# Patient Record
Sex: FEMALE | Race: WHITE | Employment: FULL TIME | URBAN - METROPOLITAN AREA
[De-identification: names, ages, dates, MRNs, and addresses within clinical notes are randomized per-mention and may not be internally consistent; named-entity substitution may affect disease eponyms.]

---

## 2019-07-10 ENCOUNTER — OFFICE VISIT (OUTPATIENT)
Dept: FAMILY MEDICINE CLINIC | Age: 29
End: 2019-07-10

## 2019-07-10 VITALS
RESPIRATION RATE: 15 BRPM | OXYGEN SATURATION: 100 % | TEMPERATURE: 98.3 F | WEIGHT: 193 LBS | HEIGHT: 67 IN | DIASTOLIC BLOOD PRESSURE: 82 MMHG | BODY MASS INDEX: 30.29 KG/M2 | SYSTOLIC BLOOD PRESSURE: 127 MMHG | HEART RATE: 71 BPM

## 2019-07-10 DIAGNOSIS — J02.9 SORE THROAT: ICD-10-CM

## 2019-07-10 DIAGNOSIS — J06.9 VIRAL UPPER RESPIRATORY TRACT INFECTION: Primary | ICD-10-CM

## 2019-07-10 LAB
QUICKVUE INFLUENZA TEST: NEGATIVE
S PYO AG THROAT QL: NEGATIVE
VALID INTERNAL CONTROL?: YES
VALID INTERNAL CONTROL?: YES

## 2019-07-10 RX ORDER — SPIRONOLACTONE 50 MG/1
75 TABLET, FILM COATED ORAL DAILY
COMMUNITY
End: 2022-02-15 | Stop reason: SDUPTHER

## 2019-07-10 NOTE — PROGRESS NOTES
Patient has had a headache and sore throat for 2 days, and has been exposed to the flu from a family member.

## 2019-07-10 NOTE — PATIENT INSTRUCTIONS
Sore Throat: Care Instructions  Your Care Instructions    Infection by bacteria or a virus causes most sore throats. Cigarette smoke, dry air, air pollution, allergies, and yelling can also cause a sore throat. Sore throats can be painful and annoying. Fortunately, most sore throats go away on their own. If you have a bacterial infection, your doctor may prescribe antibiotics. Follow-up care is a key part of your treatment and safety. Be sure to make and go to all appointments, and call your doctor if you are having problems. It's also a good idea to know your test results and keep a list of the medicines you take. How can you care for yourself at home? · If your doctor prescribed antibiotics, take them as directed. Do not stop taking them just because you feel better. You need to take the full course of antibiotics. · Gargle with warm salt water once an hour to help reduce swelling and relieve discomfort. Use 1 teaspoon of salt mixed in 1 cup of warm water. · Take an over-the-counter pain medicine, such as acetaminophen (Tylenol), ibuprofen (Advil, Motrin), or naproxen (Aleve). Read and follow all instructions on the label. · Be careful when taking over-the-counter cold or flu medicines and Tylenol at the same time. Many of these medicines have acetaminophen, which is Tylenol. Read the labels to make sure that you are not taking more than the recommended dose. Too much acetaminophen (Tylenol) can be harmful. · Drink plenty of fluids. Fluids may help soothe an irritated throat. Hot fluids, such as tea or soup, may help decrease throat pain. · Use over-the-counter throat lozenges to soothe pain. Regular cough drops or hard candy may also help. These should not be given to young children because of the risk of choking. · Do not smoke or allow others to smoke around you. If you need help quitting, talk to your doctor about stop-smoking programs and medicines.  These can increase your chances of quitting for good. · Use a vaporizer or humidifier to add moisture to your bedroom. Follow the directions for cleaning the machine. When should you call for help? Call your doctor now or seek immediate medical care if:    · You have new or worse trouble swallowing.     · Your sore throat gets much worse on one side.    Watch closely for changes in your health, and be sure to contact your doctor if you do not get better as expected. Where can you learn more? Go to http://sheila-odalys.info/. Enter [id number] [id number] in the search box to learn more about \"Sore Throat: Care Instructions. \"  Current as of: March 27, 2018  Content Version: 11.9  © 2843-1569 [employer]. Care instructions adapted under license by [employer] (which disclaims liability or warranty for this information). If you have questions about a medical condition or this instruction, always ask your healthcare professional. [address] any warranty or liability for your use of this information.

## 2019-07-10 NOTE — PROGRESS NOTES
Subjective:   Lamberto Daley is a 33 y.o. female who present complaining of flu-like symptoms:  myalgias, congestion, sore throat for 2 days. She denies dyspnea or wheezing. Smoking status: non-smoker. Flu vaccine status: vaccinated currently. Relevant PMH: No pertinent additional PMH. Review of Systems  A comprehensive review of systems was negative except for that written in the HPI. There is no problem list on file for this patient. There are no active problems to display for this patient. Current Outpatient Medications   Medication Sig Dispense Refill    spironolactone (ALDACTONE) 50 mg tablet Take  by mouth daily.  MULTIVITAMIN PO Take  by mouth.  levonorgestrel (MIRENA) 20 mcg/24 hours (5 yrs) 52 mg IUD 1 Device by IntraUTERine route once. Allergies   Allergen Reactions    Augmentin [Amoxicillin-Pot Clavulanate] Hives     Past Medical History:   Diagnosis Date    Hypertension      Past Surgical History:   Procedure Laterality Date    HX OTHER SURGICAL  2001    multiple calcium deposit cyst removals     Family History   Problem Relation Age of Onset    Cancer Mother     Diabetes Father     Hypertension Brother      Social History     Tobacco Use    Smoking status: Former Smoker     Packs/day: 0.50     Years: 4.00     Pack years: 2.00     Types: Cigarettes     Start date: 7/10/2011     Last attempt to quit: 7/10/2015     Years since quittin.0    Smokeless tobacco: Never Used   Substance Use Topics    Alcohol use: Yes     Frequency: Monthly or less       Objective:     Visit Vitals  /82   Pulse 71   Temp 98.3 °F (36.8 °C) (Oral)   Resp 15   Ht 5' 7\" (1.702 m)   Wt 193 lb (87.5 kg)   SpO2 100%   BMI 30.23 kg/m²       Appears moderately ill but not toxic; temperature as noted in vitals. Ears normal.   Throat and pharynx normal.    Neck supple. No adenopathy in the neck. Sinuses non tender. The chest is clear.   Strep and flu swab negative  Assessment/Plan: Influenza unlikely and swab negative  Likely viral uri  no flu  Symptomatic therapy suggested: rest, increase fluids and call prn if symptoms persist or worsen. Call or return to clinic prn if these symptoms worsen or fail to improve as anticipated. ICD-10-CM ICD-9-CM    1. Viral upper respiratory tract infection J06.9 465.9 AMB POC RAPID INFLUENZA TEST      AMB POC RAPID STREP A   2. Sore throat J02.9 462 AMB POC RAPID INFLUENZA TEST      AMB POC RAPID STREP A   .

## 2022-01-27 ENCOUNTER — OFFICE VISIT (OUTPATIENT)
Dept: PRIMARY CARE CLINIC | Age: 32
End: 2022-01-27
Payer: COMMERCIAL

## 2022-01-27 VITALS
HEIGHT: 67 IN | DIASTOLIC BLOOD PRESSURE: 87 MMHG | HEART RATE: 100 BPM | RESPIRATION RATE: 17 BRPM | TEMPERATURE: 97.8 F | OXYGEN SATURATION: 100 % | BODY MASS INDEX: 32.8 KG/M2 | WEIGHT: 209 LBS | SYSTOLIC BLOOD PRESSURE: 134 MMHG

## 2022-01-27 DIAGNOSIS — J02.9 PHARYNGITIS, UNSPECIFIED ETIOLOGY: Primary | ICD-10-CM

## 2022-01-27 LAB
S PYO AG THROAT QL: NEGATIVE
VALID INTERNAL CONTROL?: YES

## 2022-01-27 PROCEDURE — 99203 OFFICE O/P NEW LOW 30 MIN: CPT | Performed by: FAMILY MEDICINE

## 2022-01-27 PROCEDURE — 87880 STREP A ASSAY W/OPTIC: CPT | Performed by: FAMILY MEDICINE

## 2022-01-27 RX ORDER — MELATONIN
DAILY
COMMUNITY

## 2022-01-27 NOTE — PROGRESS NOTES
HPI     Chief Complaint   Patient presents with    New Patient   [address]     She is a 27 y.o. female who presents for establishing care. States she has a sore throat that has been bothering her for about a month or two on and off. States it irritates more when she eats things with seeds or nuts. Hurts on the R side. No pus or purulent drainage. Also has a dry cough. Exposed to View and Chew 1/17/22. Tested negative at [facility] on Friday with PCR. Sometimes has seasonal allergies, has not been taking anything. Does have some post nasal drainage. Works at [employer]. Review of Systems   Constitutional: Negative for chills and fever. HENT: Positive for sore throat. Negative for ear pain and sinus pain. Respiratory: Positive for cough. Negative for shortness of breath. Dry cough. Cardiovascular: Negative for chest pain. Gastrointestinal: Negative for diarrhea, nausea and vomiting. Reviewed PmHx, RxHx, FmHx, SocHx, AllgHx and updated and dated in the chart. Physical Exam:  Visit Vitals  /87 (BP 1 Location: Left upper arm, BP Patient Position: Sitting, BP Cuff Size: Adult long)   Pulse 100   Temp 97.8 °F (36.6 °C) (Temporal)   Resp 17   Ht 5' 7\" (1.702 m)   Wt 209 lb (94.8 kg)   SpO2 100%   BMI 32.73 kg/m²     Physical Exam  Vitals and nursing note reviewed. Constitutional:       General: She is not in acute distress. Appearance: Normal appearance. She is not ill-appearing. HENT:      Mouth/Throat:      Comments: Mildly erythematous posterior pharynx. No exudate. Cardiovascular:      Rate and Rhythm: Normal rate and regular rhythm. Heart sounds: No murmur heard. Pulmonary:      Effort: Pulmonary effort is normal. No respiratory distress. Breath sounds: Normal breath sounds. Lymphadenopathy:      Cervical: No cervical adenopathy. Neurological:      General: No focal deficit present. Mental Status: She is alert.    Psychiatric: Mood and Affect: Mood normal.         Behavior: Behavior normal.       POC Strep neg  No results found for this or any previous visit (from the past 12 hour(s)). Assessment / Plan     Diagnoses and all orders for this visit:    1. Pharyngitis, unspecified etiology  -     AMB POC RAPID STREP A       Likely 2/2 seasonal allergies. Recommend Flonase Sensimist and daily antihistamine to see if this helps. If persistent after 1-2 weeks of treatment call back for referral to ENT. I have discussed the diagnosis with the patient and the intended plan as seen in the above orders. The patient has received an after-visit summary and questions were answered concerning future plans. I have discussed medication side effects and warnings with the patient as well.     Jonna Cadena, DO

## 2022-01-27 NOTE — PATIENT INSTRUCTIONS
Sore Throat: Care Instructions  Your Care Instructions     Infection by bacteria or a virus causes most sore throats. Cigarette smoke, dry air, air pollution, allergies, and yelling can also cause a sore throat. Sore throats can be painful and annoying. Fortunately, most sore throats go away on their own. If you have a bacterial infection, your doctor may prescribe antibiotics. Follow-up care is a key part of your treatment and safety. Be sure to make and go to all appointments, and call your doctor if you are having problems. It's also a good idea to know your test results and keep a list of the medicines you take. How can you care for yourself at home? · If your doctor prescribed antibiotics, take them as directed. Do not stop taking them just because you feel better. You need to take the full course of antibiotics. · Gargle with warm salt water once an hour to help reduce swelling and relieve discomfort. Use 1 teaspoon of salt mixed in 1 cup of warm water. · Take an over-the-counter pain medicine, such as acetaminophen (Tylenol), ibuprofen (Advil, Motrin), or naproxen (Aleve). Read and follow all instructions on the label. · Be careful when taking over-the-counter cold or flu medicines and Tylenol at the same time. Many of these medicines have acetaminophen, which is Tylenol. Read the labels to make sure that you are not taking more than the recommended dose. Too much acetaminophen (Tylenol) can be harmful. · Drink plenty of fluids. Fluids may help soothe an irritated throat. Hot fluids, such as tea or soup, may help decrease throat pain. · Use over-the-counter throat lozenges to soothe pain. Regular cough drops or hard candy may also help. These should not be given to young children because of the risk of choking. · Do not smoke or allow others to smoke around you. If you need help quitting, talk to your doctor about stop-smoking programs and medicines.  These can increase your chances of quitting for good. · Use a vaporizer or humidifier to add moisture to your bedroom. Follow the directions for cleaning the machine. When should you call for help? Call your doctor now or seek immediate medical care if:    · You have new or worse trouble swallowing.     · Your sore throat gets much worse on one side. Watch closely for changes in your health, and be sure to contact your doctor if you do not get better as expected. Where can you learn more? Go to http://www.gray.com/  Enter U420 in the search box to learn more about \"Sore Throat: Care Instructions. \"  Current as of: December 2, 2020               Content Version: 13.0  © 7048-8169 [employer]. Care instructions adapted under license by [employer] (which disclaims liability or warranty for this information).  If you have questions about a medical condition or this instruction, always ask your healthcare professional. [address] any warranty or liability for your use of this information.

## 2022-01-27 NOTE — PROGRESS NOTES
Identified pt with two pt identifiers(name and ). Chief Complaint   Patient presents with    New Patient    Establish Care        3 most recent PHQ Screens 2022   Little interest or pleasure in doing things Not at all   Feeling down, depressed, irritable, or hopeless Not at all   Total Score PHQ 2 0        Vitals:    22 0854 22 0858   BP: 134/87    Pulse: (!) 113 100   Resp: 17    Temp: 97.8 °F (36.6 °C)    TempSrc: Temporal    SpO2: 100%    Weight: 209 lb (94.8 kg)    Height: 5' 7\" (1.702 m)    PainSc:   0 - No pain        Health Maintenance Due   Topic    Hepatitis C Screening     Cervical cancer screen     Flu Vaccine (1)    COVID-19 Vaccine (3 - Booster for Moderna series)       1. Have you been to the ER, urgent care clinic since your last visit? Hospitalized since your last visit? [facility] in [place] on Friday for neg COVID-19 test    2. Have you seen or consulted any other health care providers outside of the [address] [place] [address] since your last visit? Include any pap smears or colon screening.  No

## 2022-02-14 ENCOUNTER — HOSPITAL ENCOUNTER (EMERGENCY)
Age: 32
Discharge: HOME | End: 2022-02-14
Attending: EMERGENCY MEDICINE
Payer: COMMERCIAL

## 2022-02-14 ENCOUNTER — APPOINTMENT (OUTPATIENT)
Dept: GENERAL RADIOLOGY | Age: 32
End: 2022-02-14
Attending: EMERGENCY MEDICINE
Payer: COMMERCIAL

## 2022-02-14 VITALS
HEART RATE: 73 BPM | DIASTOLIC BLOOD PRESSURE: 84 MMHG | WEIGHT: 216.05 LBS | TEMPERATURE: 98.3 F | OXYGEN SATURATION: 98 % | SYSTOLIC BLOOD PRESSURE: 138 MMHG | BODY MASS INDEX: 33.84 KG/M2 | RESPIRATION RATE: 14 BRPM

## 2022-02-14 DIAGNOSIS — R07.9 CHEST PAIN, UNSPECIFIED TYPE: Primary | ICD-10-CM

## 2022-02-14 LAB
ALBUMIN SERPL-MCNC: 4.6 G/DL (ref 3.5–5)
ALBUMIN/GLOB SERPL: 1.3 {RATIO} (ref 1.1–2.2)
ALP SERPL-CCNC: 78 U/L (ref 45–117)
ALT SERPL-CCNC: 50 U/L (ref 12–78)
ANION GAP SERPL CALC-SCNC: 11 MMOL/L (ref 5–15)
AST SERPL-CCNC: 31 U/L (ref 15–37)
ATRIAL RATE: 91 BPM
BASOPHILS # BLD: 0.1 K/UL (ref 0–0.1)
BASOPHILS NFR BLD: 1 % (ref 0–1)
BILIRUB SERPL-MCNC: 0.3 MG/DL (ref 0.2–1)
BUN SERPL-MCNC: 15 MG/DL (ref 6–20)
BUN/CREAT SERPL: 20 (ref 12–20)
CALCIUM SERPL-MCNC: 9.3 MG/DL (ref 8.5–10.1)
CALCULATED P AXIS, ECG09: 36 DEGREES
CALCULATED R AXIS, ECG10: 1 DEGREES
CALCULATED T AXIS, ECG11: 18 DEGREES
CHLORIDE SERPL-SCNC: 100 MMOL/L (ref 97–108)
CO2 SERPL-SCNC: 26 MMOL/L (ref 21–32)
CREAT SERPL-MCNC: 0.74 MG/DL (ref 0.55–1.02)
D DIMER PPP FEU-MCNC: 0.22 MG/L FEU (ref 0–0.65)
DIAGNOSIS, 93000: NORMAL
DIFFERENTIAL METHOD BLD: ABNORMAL
EOSINOPHIL # BLD: 0.2 K/UL (ref 0–0.4)
EOSINOPHIL NFR BLD: 2 % (ref 0–7)
ERYTHROCYTE [DISTWIDTH] IN BLOOD BY AUTOMATED COUNT: 10.9 % (ref 11.5–14.5)
GLOBULIN SER CALC-MCNC: 3.6 G/DL (ref 2–4)
GLUCOSE SERPL-MCNC: 103 MG/DL (ref 65–100)
HCG UR QL: NEGATIVE
HCT VFR BLD AUTO: 41 % (ref 35–47)
HGB BLD-MCNC: 13.9 G/DL (ref 11.5–16)
IMM GRANULOCYTES # BLD AUTO: 0 K/UL (ref 0–0.04)
IMM GRANULOCYTES NFR BLD AUTO: 0 % (ref 0–0.5)
LIPASE SERPL-CCNC: 123 U/L (ref 73–393)
LYMPHOCYTES # BLD: 3.8 K/UL (ref 0.8–3.5)
LYMPHOCYTES NFR BLD: 43 % (ref 12–49)
MCH RBC QN AUTO: 31.9 PG (ref 26–34)
MCHC RBC AUTO-ENTMCNC: 33.9 G/DL (ref 30–36.5)
MCV RBC AUTO: 94 FL (ref 80–99)
MONOCYTES # BLD: 0.4 K/UL (ref 0–1)
MONOCYTES NFR BLD: 5 % (ref 5–13)
NEUTS SEG # BLD: 4.5 K/UL (ref 1.8–8)
NEUTS SEG NFR BLD: 49 % (ref 32–75)
NRBC # BLD: 0 K/UL (ref 0–0.01)
NRBC BLD-RTO: 0 PER 100 WBC
P-R INTERVAL, ECG05: 130 MS
PLATELET # BLD AUTO: 458 K/UL (ref 150–400)
PMV BLD AUTO: 8.7 FL (ref 8.9–12.9)
POTASSIUM SERPL-SCNC: 3.4 MMOL/L (ref 3.5–5.1)
PROT SERPL-MCNC: 8.2 G/DL (ref 6.4–8.2)
Q-T INTERVAL, ECG07: 350 MS
QRS DURATION, ECG06: 84 MS
QTC CALCULATION (BEZET), ECG08: 430 MS
RBC # BLD AUTO: 4.36 M/UL (ref 3.8–5.2)
SODIUM SERPL-SCNC: 137 MMOL/L (ref 136–145)
TROPONIN-HIGH SENSITIVITY: 7 NG/L (ref 0–51)
VENTRICULAR RATE, ECG03: 91 BPM
WBC # BLD AUTO: 9 K/UL (ref 3.6–11)

## 2022-02-14 PROCEDURE — 84484 ASSAY OF TROPONIN QUANT: CPT

## 2022-02-14 PROCEDURE — 85379 FIBRIN DEGRADATION QUANT: CPT

## 2022-02-14 PROCEDURE — 85025 COMPLETE CBC W/AUTO DIFF WBC: CPT

## 2022-02-14 PROCEDURE — 81025 URINE PREGNANCY TEST: CPT

## 2022-02-14 PROCEDURE — 71046 X-RAY EXAM CHEST 2 VIEWS: CPT

## 2022-02-14 PROCEDURE — 80053 COMPREHEN METABOLIC PANEL: CPT

## 2022-02-14 PROCEDURE — 36415 COLL VENOUS BLD VENIPUNCTURE: CPT

## 2022-02-14 PROCEDURE — 93005 ELECTROCARDIOGRAM TRACING: CPT

## 2022-02-14 PROCEDURE — 99285 EMERGENCY DEPT VISIT HI MDM: CPT

## 2022-02-14 PROCEDURE — 83690 ASSAY OF LIPASE: CPT

## 2022-02-14 NOTE — DISCHARGE INSTRUCTIONS
Return the emergency department if your symptoms are worsening, if you are unable to eat or drink, very short of breath, or develop any other symptoms you find concerning. In the meantime please follow-up with your primary care physician. I recommend you take some over-the-counter omeprazole given your symptoms started after you are having heartburn which may indicate some esophagitis.

## 2022-02-14 NOTE — ED PROVIDER NOTES
HPI   16-year-old female with past medical history of hypertension who presents to the emergency department due to chest tightness. She has had the symptoms for the last 2 days. Prior to this she was having issues with acid reflux and was taking some antacids which improved the burning sensation she had, however after that she developed fairly persistent chest tightness without any exacerbating or alleviating factors. She denies shortness of breath, palpitations, or syncope. She denies any abdominal pain, nausea, or vomiting. She has never had symptoms like this in the past and states that the chest tightness was \"freaking her out\". She denies fevers or chills. She denies cough. Past Medical History:   Diagnosis Date    HPV in female     Hypertension        Past Surgical History:   Procedure Laterality Date    HX OTHER SURGICAL  2001    multiple calcium deposit cyst removals         Family History:   Problem Relation Age of Onset    Cancer Mother     Diabetes Father     Hypertension Brother        Social History     Socioeconomic History    Marital status: SINGLE     Spouse name: Not on file    Number of children: Not on file    Years of education: Not on file    Highest education level: Not on file   Occupational History    Not on file   Tobacco Use    Smoking status: Former Smoker     Packs/day: 0.50     Years: 4.00     Pack years: 2.00     Types: Cigarettes     Start date: 7/10/2011     Quit date: 7/10/2015     Years since quittin.6    Smokeless tobacco: Never Used   Vaping Use    Vaping Use: Never used   Substance and Sexual Activity    Alcohol use:  Yes    Drug use: Never    Sexual activity: Not Currently   Other Topics Concern    Not on file   Social History Narrative    Not on file     Social Determinants of Health     Financial Resource Strain:     Difficulty of Paying Living Expenses: Not on file   Food Insecurity:     Worried About Running Out of Food in the Last Year: Not on file    [address] in the Last Year: Not on file   Transportation Needs:     Lack of Transportation (Medical): Not on file    Lack of Transportation (Non-Medical): Not on file   Physical Activity: Unknown    Days of Exercise per Week: 0 days    Minutes of Exercise per Session: Not on file   Stress:     Feeling of Stress : Not on file   Social Connections:     Frequency of Communication with Friends and Family: Not on file    Frequency of Social Gatherings with Friends and Family: Not on file    Attends [employer] Services: Not on file    Active Member of [address] [place] [address] or Organizations: Not on file    Attends Club or Organization Meetings: Not on file    Marital Status: Not on file   Intimate Partner Violence: Not At Risk    Fear of Current or Ex-Partner: No    Emotionally Abused: No    Physically Abused: No    Sexually Abused: No   Housing Stability:     Unable to Pay for Housing in the Last Year: Not on file    Number of [place] in the Last Year: Not on file    Unstable Housing in the Last Year: Not on file         ALLERGIES: Augmentin [amoxicillin-pot clavulanate]    Review of Systems   A complete review of systems was performed and all systems reviewed are negative unless otherwise documented in the HPI. Vitals:    02/14/22 1324 02/14/22 1330   BP: (!) 167/115 (!) 158/91   Pulse: 90 93   Resp: 16    Temp: 98.3 °F (36.8 °C)    SpO2: 100% 98%   Weight: 98 kg (216 lb 0.8 oz)             Physical Exam  Constitutional:       Comments: Appears well. No acute distress noted   Eyes:      Extraocular Movements: Extraocular movements intact. Comments: No scleral icterus   Neck:      Vascular: No JVD. Comments: Trachea midline  Cardiovascular:      Comments: Regular rate and rhythm without murmurs. 2+ radial pulses bilaterally no murmurs  Pulmonary:      Comments: Lungs are clear bilaterally. No wheezes or rales.   No respiratory distress  Abdominal:      Comments: Soft, nontender, nondistended. Negative Méndez sign. No CVA tenderness bilaterally   Musculoskeletal:         General: Normal range of motion. Right lower leg: No edema. Left lower leg: No edema. Comments: No calf tenderness bilaterally   Skin:     General: Skin is warm and dry. Neurological:      Comments: Awake and alert. Speech is normal.  GCS 15. MDM   59-year-old female presents with the above chief complaint. Vital signs are stable. EKG shows normal sinus rhythm with a rate of 91 QTC of 430. Her exam is unremarkable. Work-up will include cardiac enzymes, basic labs, and a D-dimer. Chest x-ray will be ordered as well. Of a fairly low suspicion for ACS. The patient is a non-smoker and has no family history of coronary artery disease. If work-up is reassuring I feel she can be safely discharged and follow-up with her primary physicians. Lab work is reassuring. D-dimer within normal limits. Troponin is only 7. Chest x-ray clear. Given her reassuring work-up patient deemed safe for discharge. I told the patient to take some omeprazole given her symptoms started after she was having GERD and to also follow-up with her primary care physician. Return precautions provided and patient discharged in stable condition.     Procedures

## 2022-02-14 NOTE — ED TRIAGE NOTES
Pt arrives with chest pressure for 2 days. Pt reports having some reflux pain that resolved with antacids but reports the pressure has remained and pt has started having some anxiety regarding the pain. Denies SOB. No cardiac hx.

## 2022-02-15 RX ORDER — POTASSIUM CHLORIDE 750 MG/1
20 TABLET, EXTENDED RELEASE ORAL DAILY
Qty: 60 TABLET | Refills: 2 | Status: SHIPPED | OUTPATIENT
Start: 2022-02-15

## 2022-02-15 RX ORDER — SPIRONOLACTONE 50 MG/1
75 TABLET, FILM COATED ORAL DAILY
Qty: 45 TABLET | Refills: 2 | Status: SHIPPED | OUTPATIENT
Start: 2022-02-15

## 2022-09-23 NOTE — TELEPHONE ENCOUNTER
PCP: Jud Mullins DO    Last appt: 6/17/2022  Future Appointments   Date Time Provider Reina Allison   11/4/2022  8:30 AM Jud Mullins DO [facility]       Requested Prescriptions     Pending Prescriptions Disp Refills    spironolactone (ALDACTONE) 50 mg tablet 45 Tablet 2     Sig: Take 1.5 Tablets by mouth daily. potassium chloride (KLOR-CON M10) 10 mEq tablet 60 Tablet 2     Sig: Take 2 Tablets by mouth daily.  Indications: low amount of potassium in the blood         Other Comments:

## 2022-09-24 RX ORDER — POTASSIUM CHLORIDE 750 MG/1
20 TABLET, EXTENDED RELEASE ORAL DAILY
Qty: 60 TABLET | Refills: 2 | OUTPATIENT
Start: 2022-09-24

## 2022-09-24 RX ORDER — SPIRONOLACTONE 50 MG/1
75 TABLET, FILM COATED ORAL DAILY
Qty: 45 TABLET | Refills: 2 | OUTPATIENT
Start: 2022-09-24

## 2022-09-26 ENCOUNTER — HOSPITAL ENCOUNTER (EMERGENCY)
Age: 32
Discharge: HOME | End: 2022-09-26
Attending: EMERGENCY MEDICINE
Payer: COMMERCIAL

## 2022-09-26 ENCOUNTER — APPOINTMENT (OUTPATIENT)
Dept: GENERAL RADIOLOGY | Age: 32
End: 2022-09-26
Attending: EMERGENCY MEDICINE
Payer: COMMERCIAL

## 2022-09-26 VITALS
RESPIRATION RATE: 17 BRPM | HEIGHT: 67 IN | DIASTOLIC BLOOD PRESSURE: 90 MMHG | TEMPERATURE: 98.1 F | HEART RATE: 82 BPM | WEIGHT: 227.96 LBS | BODY MASS INDEX: 35.78 KG/M2 | OXYGEN SATURATION: 100 % | SYSTOLIC BLOOD PRESSURE: 134 MMHG

## 2022-09-26 DIAGNOSIS — R07.9 CHEST PAIN, UNSPECIFIED TYPE: Primary | ICD-10-CM

## 2022-09-26 LAB
ALBUMIN SERPL-MCNC: 3.4 G/DL (ref 3.5–5)
ALBUMIN/GLOB SERPL: 1 {RATIO} (ref 1.1–2.2)
ALP SERPL-CCNC: 57 U/L (ref 45–117)
ALT SERPL-CCNC: 52 U/L (ref 12–78)
ANION GAP SERPL CALC-SCNC: 11 MMOL/L (ref 5–15)
AST SERPL-CCNC: 29 U/L (ref 15–37)
BASOPHILS # BLD: 0.1 K/UL (ref 0–0.1)
BASOPHILS NFR BLD: 1 % (ref 0–1)
BILIRUB SERPL-MCNC: 0.3 MG/DL (ref 0.2–1)
BUN SERPL-MCNC: 9 MG/DL (ref 6–20)
BUN/CREAT SERPL: 14 (ref 12–20)
CALCIUM SERPL-MCNC: 7.6 MG/DL (ref 8.5–10.1)
CHLORIDE SERPL-SCNC: 106 MMOL/L (ref 97–108)
CO2 SERPL-SCNC: 23 MMOL/L (ref 21–32)
CREAT SERPL-MCNC: 0.66 MG/DL (ref 0.55–1.02)
D DIMER PPP FEU-MCNC: 0.31 MG/L FEU (ref 0–0.65)
DIFFERENTIAL METHOD BLD: ABNORMAL
EOSINOPHIL # BLD: 0.2 K/UL (ref 0–0.4)
EOSINOPHIL NFR BLD: 2 % (ref 0–7)
ERYTHROCYTE [DISTWIDTH] IN BLOOD BY AUTOMATED COUNT: 11.4 % (ref 11.5–14.5)
GLOBULIN SER CALC-MCNC: 3.3 G/DL (ref 2–4)
GLUCOSE SERPL-MCNC: 94 MG/DL (ref 65–100)
HCT VFR BLD AUTO: 36.3 % (ref 35–47)
HGB BLD-MCNC: 12.2 G/DL (ref 11.5–16)
IMM GRANULOCYTES # BLD AUTO: 0 K/UL (ref 0–0.04)
IMM GRANULOCYTES NFR BLD AUTO: 1 % (ref 0–0.5)
LYMPHOCYTES # BLD: 3 K/UL (ref 0.8–3.5)
LYMPHOCYTES NFR BLD: 39 % (ref 12–49)
MCH RBC QN AUTO: 31 PG (ref 26–34)
MCHC RBC AUTO-ENTMCNC: 33.6 G/DL (ref 30–36.5)
MCV RBC AUTO: 92.4 FL (ref 80–99)
MONOCYTES # BLD: 0.4 K/UL (ref 0–1)
MONOCYTES NFR BLD: 5 % (ref 5–13)
NEUTS SEG # BLD: 3.9 K/UL (ref 1.8–8)
NEUTS SEG NFR BLD: 52 % (ref 32–75)
NRBC # BLD: 0 K/UL (ref 0–0.01)
NRBC BLD-RTO: 0 PER 100 WBC
PLATELET # BLD AUTO: 417 K/UL (ref 150–400)
PMV BLD AUTO: 8.7 FL (ref 8.9–12.9)
POTASSIUM SERPL-SCNC: 3.5 MMOL/L (ref 3.5–5.1)
PROT SERPL-MCNC: 6.7 G/DL (ref 6.4–8.2)
RBC # BLD AUTO: 3.93 M/UL (ref 3.8–5.2)
SODIUM SERPL-SCNC: 140 MMOL/L (ref 136–145)
TROPONIN-HIGH SENSITIVITY: 5 NG/L (ref 0–51)
TROPONIN-HIGH SENSITIVITY: 6 NG/L (ref 0–51)
WBC # BLD AUTO: 7.5 K/UL (ref 3.6–11)

## 2022-09-26 PROCEDURE — 93005 ELECTROCARDIOGRAM TRACING: CPT

## 2022-09-26 PROCEDURE — 36415 COLL VENOUS BLD VENIPUNCTURE: CPT

## 2022-09-26 PROCEDURE — 99285 EMERGENCY DEPT VISIT HI MDM: CPT

## 2022-09-26 PROCEDURE — 84484 ASSAY OF TROPONIN QUANT: CPT

## 2022-09-26 PROCEDURE — 85025 COMPLETE CBC W/AUTO DIFF WBC: CPT

## 2022-09-26 PROCEDURE — 85379 FIBRIN DEGRADATION QUANT: CPT

## 2022-09-26 PROCEDURE — 80053 COMPREHEN METABOLIC PANEL: CPT

## 2022-09-26 PROCEDURE — 71045 X-RAY EXAM CHEST 1 VIEW: CPT

## 2022-09-26 RX ORDER — PREDNISONE 50 MG/1
50 TABLET ORAL DAILY
Qty: 3 TABLET | Refills: 0 | Status: SHIPPED | OUTPATIENT
Start: 2022-09-26 | End: 2022-09-29

## 2022-09-26 NOTE — ED PROVIDER NOTES
21-year-old female with chest pain and a history of high blood pressure. She is had chest pain for about a day or day and a half. No diaphoresis no nausea or vomiting. She does have a history of GE reflux as well. Chest pain began 2 weeks ago when she had COVID. The history is provided by the patient. Chest Pain (Angina)   This is a new problem. The current episode started 12 to 24 hours ago. The problem has been gradually improving. The problem occurs constantly. The pain is present in the lateral region and left side. The pain is mild. The quality of the pain is described as pressure-like. The pain does not radiate. The symptoms are aggravated by movement. Associated symptoms include numbness. Pertinent negatives include no abdominal pain, no back pain, no diaphoresis, no dizziness, no exertional chest pressure, no fever, no headaches, no irregular heartbeat, no malaise/fatigue, no nausea, no shortness of breath, no vomiting and no weakness. She has tried nothing for the symptoms. Risk factors include no risk factors. Her past medical history is significant for HTN. Her past medical history does not include aneurysm, DM, DVT or PE. Pertinent negatives include no cardiac catheterization, no persantine thallium, no stress echo, no exercise treadmill test, no cardiac stents, no angioplasty and no pacemaker. Numbness  This is a new problem. The problem has not changed since onset. There was no focality noted. Pertinent negatives include no focal weakness, no slurred speech, no speech difficulty, no memory loss, no movement disorder, no agitation, no mental status change, no unresponsiveness and no disorientation. Associated symptoms include chest pain. Pertinent negatives include no shortness of breath, no vomiting, no headaches and no nausea. Associated medical issues do not include trauma or seizures.       Past Medical History:   Diagnosis Date    HPV in female     Hypertension        Past Surgical History: Procedure Laterality Date    HX OTHER SURGICAL  2001    multiple calcium deposit cyst removals         Family History:   Problem Relation Age of Onset    Cancer Mother     Diabetes Father     Hypertension Brother        Social History     Socioeconomic History    Marital status: SINGLE     Spouse name: Not on file    Number of children: Not on file    Years of education: Not on file    Highest education level: Not on file   Occupational History    Not on file   Tobacco Use    Smoking status: Former     Packs/day: 0.50     Years: 4.00     Pack years: 2.00     Types: Cigarettes     Start date: 7/10/2011     Quit date: 7/10/2015     Years since quittin.2    Smokeless tobacco: Never   Vaping Use    Vaping Use: Never used   Substance and Sexual Activity    Alcohol use: Yes    Drug use: Never    Sexual activity: Not Currently   Other Topics Concern    Not on file   Social History Narrative    Not on file     Social Determinants of Health     Financial Resource Strain: Not on file   Food Insecurity: Not on file   Transportation Needs: Not on file   Physical Activity: Unknown    Days of Exercise per Week: 0 days    Minutes of Exercise per Session: Not on file   Stress: Not on file   Social Connections: Not on file   Intimate Partner Violence: Not At Risk    Fear of Current or Ex-Partner: No    Emotionally Abused: No    Physically Abused: No    Sexually Abused: No   Housing Stability: Not on file         ALLERGIES: Augmentin [amoxicillin-pot clavulanate]    Review of Systems   Constitutional:  Negative for chills, diaphoresis, fever and malaise/fatigue. HENT:  Negative for congestion, rhinorrhea, sneezing and sore throat. Respiratory:  Negative for shortness of breath. Cardiovascular:  Positive for chest pain. Gastrointestinal:  Negative for abdominal pain, nausea and vomiting. Musculoskeletal:  Negative for back pain, myalgias and neck stiffness. Skin:  Negative for rash.    Neurological:  Positive for numbness. Negative for dizziness, focal weakness, speech difficulty, weakness and headaches. Psychiatric/Behavioral:  Negative for agitation and memory loss. All other systems reviewed and are negative. Vitals:    09/26/22 0846 09/26/22 0851   BP: (!) 176/102    Pulse: (!) 109    Resp: 16    Temp: 98.1 °F (36.7 °C)    SpO2: 100% 100%   Weight: 103.4 kg (227 lb 15.3 oz)    Height: 5' 7\" (1.702 m)             Physical Exam  Vitals and nursing note reviewed. Constitutional:       Appearance: Normal appearance. She is well-developed. HENT:      Head: Normocephalic and atraumatic. Eyes:      Conjunctiva/sclera: Conjunctivae normal.   Cardiovascular:      Rate and Rhythm: Normal rate and regular rhythm. Pulses: Normal pulses. Heart sounds: Normal heart sounds, S1 normal and S2 normal.   Pulmonary:      Effort: Pulmonary effort is normal. No respiratory distress. Breath sounds: Normal breath sounds. No wheezing. Abdominal:      General: Bowel sounds are normal. There is no distension. Palpations: Abdomen is soft. Tenderness: There is no abdominal tenderness. There is no rebound. Musculoskeletal:         General: Normal range of motion. Cervical back: Full passive range of motion without pain, normal range of motion and neck supple. Skin:     General: Skin is warm and dry. Findings: No rash. Neurological:      Mental Status: She is alert and oriented to person, place, and time. Psychiatric:         Speech: Speech normal.         Behavior: Behavior normal.         Thought Content: Thought content normal.         Judgment: Judgment normal.        MDM  Number of Diagnoses or Management Options  Diagnosis management comments: Chest pain evaluation today for arrhythmia versus PE versus acute coronary syndrome. Work-up is negative today and patient is feeling better. Troponin is negative twice and she is suitable for discharge to home.            Procedures

## 2022-09-26 NOTE — ED TRIAGE NOTES
Patient reports chest  tightness with left arm tingling started on September 11th at the same time she had Covid. Last night reports the tightness felt worse. Denies N/V/D, SOB. Reports Hx of HTN.

## 2022-09-27 LAB
ATRIAL RATE: 340 BPM
ATRIAL RATE: 81 BPM
CALCULATED P AXIS, ECG09: 23 DEGREES
CALCULATED P AXIS, ECG09: 43 DEGREES
CALCULATED R AXIS, ECG10: -4 DEGREES
CALCULATED R AXIS, ECG10: 6 DEGREES
CALCULATED T AXIS, ECG11: 13 DEGREES
CALCULATED T AXIS, ECG11: 17 DEGREES
DIAGNOSIS, 93000: NORMAL
DIAGNOSIS, 93000: NORMAL
P-R INTERVAL, ECG05: 162 MS
Q-T INTERVAL, ECG07: 374 MS
Q-T INTERVAL, ECG07: 388 MS
QRS DURATION, ECG06: 88 MS
QRS DURATION, ECG06: 92 MS
QTC CALCULATION (BEZET), ECG08: 439 MS
QTC CALCULATION (BEZET), ECG08: 450 MS
VENTRICULAR RATE, ECG03: 81 BPM
VENTRICULAR RATE, ECG03: 83 BPM

## 2022-11-22 ENCOUNTER — OFFICE VISIT (OUTPATIENT)
Dept: PRIMARY CARE CLINIC | Age: 32
End: 2022-11-22
Payer: COMMERCIAL

## 2022-11-22 VITALS
BODY MASS INDEX: 35.79 KG/M2 | RESPIRATION RATE: 12 BRPM | HEIGHT: 67 IN | DIASTOLIC BLOOD PRESSURE: 85 MMHG | TEMPERATURE: 98.6 F | HEART RATE: 101 BPM | WEIGHT: 228 LBS | OXYGEN SATURATION: 98 % | SYSTOLIC BLOOD PRESSURE: 136 MMHG

## 2022-11-22 DIAGNOSIS — Z11.59 ENCOUNTER FOR HEPATITIS C SCREENING TEST FOR LOW RISK PATIENT: ICD-10-CM

## 2022-11-22 DIAGNOSIS — R12 HEARTBURN: ICD-10-CM

## 2022-11-22 DIAGNOSIS — K90.49 FOOD INTOLERANCE: ICD-10-CM

## 2022-11-22 DIAGNOSIS — Z83.79 FAMILY HISTORY OF CELIAC DISEASE: ICD-10-CM

## 2022-11-22 DIAGNOSIS — Z23 ENCOUNTER FOR IMMUNIZATION: ICD-10-CM

## 2022-11-22 DIAGNOSIS — R13.14 PHARYNGOESOPHAGEAL DYSPHAGIA: Primary | ICD-10-CM

## 2022-11-22 PROCEDURE — 90686 IIV4 VACC NO PRSV 0.5 ML IM: CPT | Performed by: NURSE PRACTITIONER

## 2022-11-22 PROCEDURE — 90471 IMMUNIZATION ADMIN: CPT | Performed by: NURSE PRACTITIONER

## 2022-11-22 PROCEDURE — 99204 OFFICE O/P NEW MOD 45 MIN: CPT | Performed by: NURSE PRACTITIONER

## 2022-11-22 RX ORDER — SUCRALFATE 1 G/10ML
1 SUSPENSION ORAL
Qty: 414 ML | Refills: 0 | Status: SHIPPED | OUTPATIENT
Start: 2022-11-22

## 2022-11-22 RX ORDER — PANTOPRAZOLE SODIUM 40 MG/1
40 TABLET, DELAYED RELEASE ORAL DAILY
Qty: 30 TABLET | Refills: 1 | Status: SHIPPED | OUTPATIENT
Start: 2022-11-22

## 2022-11-22 NOTE — PROGRESS NOTES
Chief Complaint   Patient presents with    Heartburn     Ongoing- pt states that she is having heartburn that sits in the back of throat- pt has taken medication for heartburn with no relief     Immunization/Injection     Pt would like flu vaccine        Health Maintenance Due   Topic    Hepatitis C Screening     Cervical cancer screen     COVID-19 Vaccine (3 - Booster for Moderna series)    Flu Vaccine (1)        1. \"Have you been to the ER, urgent care clinic since your last visit? Hospitalized since your last visit? \" No    2. \"Have you seen or consulted any other health care providers outside of the [address] [place] [address] since your last visit? \" No     3. For patients aged 39-70: Has the patient had a colonoscopy / FIT/ Cologuard? NA - based on age      If the patient is female:    4. For patients aged 41-77: Has the patient had a mammogram within the past 2 years? NA - based on age or sex      11. For patients aged 21-65: Has the patient had a pap smear?  No     Visit Vitals  Resp 12   Ht 5' 7\" (1.702 m)   Wt 228 lb (103.4 kg)   LMP  (Exact Date)   BMI 35.71 kg/m²

## 2022-11-22 NOTE — PROGRESS NOTES
[facility]   [address], [address] [facility]  P: [phone]  F: [phone]    SUBJECTIVE     HPI:     Sabrina Frey is a 34 y.o. female who is seen in the clinic for   Chief Complaint   Patient presents with    Heartburn     Ongoing- pt states that she is having heartburn that sits in the back of throat- pt has taken medication for heartburn with no relief     Immunization/Injection     Pt would like flu vaccine       The patient presents today with complaints that began 1 year prior. She endorses esopharyngeal dysphagia that is worse when laying down. At times, heartburn accompanies the dysphagia. Denies any triggers. Has taken OTC Prilosec w/ minimal relief. Has not seen GI for EGD. Hx of Seasonal Allergies. Denies any current symptoms. Has a strong family hx of Celiac Disease and would like to be tested for it today. Would like flu vaccine. The patient's PCP is Dr. Bello. There are no problems to display for this patient.        Past Medical History:   Diagnosis Date    HPV in female     Hypertension      Past Surgical History:   Procedure Laterality Date    HX OTHER SURGICAL  2001    multiple calcium deposit cyst removals     Social History     Socioeconomic History    Marital status: SINGLE     Spouse name: Not on file    Number of children: Not on file    Years of education: Not on file    Highest education level: Not on file   Occupational History    Not on file   Tobacco Use    Smoking status: Former     Packs/day: 0.50     Years: 4.00     Pack years: 2.00     Types: Cigarettes     Start date: 7/10/2011     Quit date: 7/10/2015     Years since quittin.3    Smokeless tobacco: Never   Vaping Use    Vaping Use: Never used   Substance and Sexual Activity    Alcohol use: Yes    Drug use: Never    Sexual activity: Not Currently   Other Topics Concern    Not on file   Social History Narrative    Not on file     Social Determinants of Health Financial Resource Strain: Low Risk     Difficulty of Paying Living Expenses: Not hard at all   Food Insecurity: No Food Insecurity    Worried About Running Out of Food in the Last Year: Never true    Ran Out of Food in the Last Year: Never true   Transportation Needs: Not on file   Physical Activity: Unknown    Days of Exercise per Week: 0 days    Minutes of Exercise per Session: Not on file   Stress: Not on file   Social Connections: Not on file   Intimate Partner Violence: Not At Risk    Fear of Current or Ex-Partner: No    Emotionally Abused: No    Physically Abused: No    Sexually Abused: No   Housing Stability: Not on file     Family History   Problem Relation Age of Onset    Cancer Mother     Diabetes Father     Hypertension Brother      Immunization History   Administered Date(s) Administered    COVID-19, MODERNA BLUE border, Primary or Immunocompromised, (age 18y+), IM, 100 mcg/0.5mL 03/10/2021, 04/07/2021    Td 09/21/2020    Tdap 04/27/2018      Allergies   Allergen Reactions    Augmentin [Amoxicillin-Pot Clavulanate] Hives       Admission on 09/26/2022, Discharged on 09/26/2022   Component Date Value Ref Range Status    Ventricular Rate 09/26/2022 81  BPM Final    Atrial Rate 09/26/2022 81  BPM Final    P-R Interval 09/26/2022 162  ms Final    QRS Duration 09/26/2022 92  ms Final    Q-T Interval 09/26/2022 388  ms Final    QTC Calculation (Bezet) 09/26/2022 450  ms Final    Calculated P Axis 09/26/2022 23  degrees Final    Calculated R Axis 09/26/2022 6  degrees Final    Calculated T Axis 09/26/2022 13  degrees Final    Diagnosis 09/26/2022    Final                    Value:Normal sinus rhythm  Normal ECG  Confirmed by Alea Javier M.D., Elisha Levy ([id number]) on 9/27/2022 8:31:17 AM      WBC 09/26/2022 7.5  3.6 - 11.0 K/uL Final    RBC 09/26/2022 3.93  3.80 - 5.20 M/uL Final    HGB 09/26/2022 12.2  11.5 - 16.0 g/dL Final    HCT 09/26/2022 36.3  35.0 - 47.0 % Final    MCV 09/26/2022 92.4  80.0 - 99.0 FL Final MCH 09/26/2022 31.0  26.0 - 34.0 PG Final    MCHC 09/26/2022 33.6  30.0 - 36.5 g/dL Final    RDW 09/26/2022 11.4 (A)  11.5 - 14.5 % Final    PLATELET 58/93/7432 914 (A)  150 - 400 K/uL Final    MPV 09/26/2022 8.7 (A)  8.9 - 12.9 FL Final    NRBC 09/26/2022 0.0  0  WBC Final    ABSOLUTE NRBC 09/26/2022 0.00  0.00 - 0.01 K/uL Final    NEUTROPHILS 09/26/2022 52  32 - 75 % Final    LYMPHOCYTES 09/26/2022 39  12 - 49 % Final    MONOCYTES 09/26/2022 5  5 - 13 % Final    EOSINOPHILS 09/26/2022 2  0 - 7 % Final    BASOPHILS 09/26/2022 1  0 - 1 % Final    IMMATURE GRANULOCYTES 09/26/2022 1 (A)  0.0 - 0.5 % Final    ABS. NEUTROPHILS 09/26/2022 3.9  1.8 - 8.0 K/UL Final    ABS. LYMPHOCYTES 09/26/2022 3.0  0.8 - 3.5 K/UL Final    ABS. MONOCYTES 09/26/2022 0.4  0.0 - 1.0 K/UL Final    ABS. EOSINOPHILS 09/26/2022 0.2  0.0 - 0.4 K/UL Final    ABS. BASOPHILS 09/26/2022 0.1  0.0 - 0.1 K/UL Final    ABS. IMM. GRANS. 09/26/2022 0.0  0.00 - 0.04 K/UL Final    DF 09/26/2022 AUTOMATED    Final    Sodium 09/26/2022 140  136 - 145 mmol/L Final    Potassium 09/26/2022 3.5  3.5 - 5.1 mmol/L Final    Chloride 09/26/2022 106  97 - 108 mmol/L Final    CO2 09/26/2022 23  21 - 32 mmol/L Final    Anion gap 09/26/2022 11  5 - 15 mmol/L Final    Glucose 09/26/2022 94  65 - 100 mg/dL Final    BUN 09/26/2022 9  6 - 20 MG/DL Final    Creatinine 09/26/2022 0.66  0.55 - 1.02 MG/DL Final    BUN/Creatinine ratio 09/26/2022 14  12 - 20   Final    GFR est AA 09/26/2022 >60  >60 ml/min/1.73m2 Final    GFR est non-AA 09/26/2022 >60  >60 ml/min/1.73m2 Final    Estimated GFR is calculated using the IDMS-traceable Modification of Diet in Renal Disease (MDRD) Study equation, reported for both  Americans (GFRAA) and non- Americans (GFRNA), and normalized to 1.73m2 body surface area. The physician must decide which value applies to the patient.     Calcium 09/26/2022 7.6 (A)  8.5 - 10.1 MG/DL Final    Bilirubin, total 09/26/2022 0.3  0.2 - 1.0 MG/DL Final    ALT (SGPT) 09/26/2022 52  12 - 78 U/L Final    AST (SGOT) 09/26/2022 29  15 - 37 U/L Final    Alk. phosphatase 09/26/2022 57  45 - 117 U/L Final    Protein, total 09/26/2022 6.7  6.4 - 8.2 g/dL Final    Albumin 09/26/2022 3.4 (A)  3.5 - 5.0 g/dL Final    Globulin 09/26/2022 3.3  2.0 - 4.0 g/dL Final    A-G Ratio 09/26/2022 1.0 (A)  1.1 - 2.2   Final    D-dimer 09/26/2022 0.31  0.00 - 0.65 mg/L FEU Final    Comment: (NOTE)  The combination of a low pre-test probability based on Wells criteria  and a D-Dimer result below the cutoff value of 0.5 mg/L increases the   negative predictive value for DVT to %. Troponin-High Sensitivity 09/26/2022 5  0 - 51 ng/L Final    Comment: A HS troponin value change of (+ or -) 50% or more below the 99th percentile, in a 1/2/3 hr interval represents a significant change. Clinical correcation is recommended. A HS troponin value change of (+ or -) 20% or above the 99th percentile, in a 1/2/3 hr interval represents a significant change. Clinical correlation is recommended.   99th Percentile:   Women: 0-51 ng/L                                                                Men:   0-76 ng/L      Ventricular Rate 09/26/2022 83  BPM Final    Atrial Rate 09/26/2022 340  BPM Final    QRS Duration 09/26/2022 88  ms Final    Q-T Interval 09/26/2022 374  ms Final    QTC Calculation (Bezet) 09/26/2022 439  ms Final    Calculated P Axis 09/26/2022 43  degrees Final    Calculated R Axis 09/26/2022 -4  degrees Final    Calculated T Axis 09/26/2022 17  degrees Final    Diagnosis 09/26/2022    Final                    Value:** Poor data quality, interpretation may be adversely affected  Probably Normal sinus rhythm  Suggest repeat tracing  Confirmed by Alice Nelson M.D., Dannie Olivera ([id number]) on 9/27/2022 8:30:41 AM      Troponin-High Sensitivity 09/26/2022 6  0 - 51 ng/L Final    Comment: A HS troponin value change of (+ or -) 50% or more below the 99th percentile, in a 1/2/3 hr interval represents a significant change. Clinical correcation is recommended. A HS troponin value change of (+ or -) 20% or above the 99th percentile, in a 1/2/3 hr interval represents a significant change. Clinical correlation is recommended. 99th Percentile:   Women: 0-51 ng/L                                                                Men:   0-76 ng/L        XR CHEST PORT  Narrative: INDICATION: Chest pain, numbness. Portable AP view of the chest.    Direct comparison made to prior chest x-ray dated February 2022. Cardiomediastinal silhouette is stable. Lungs are clear bilaterally. Pleural  spaces are normal and there is no pneumothorax. Osseous structures are intact. Impression: No acute cardiopulmonary disease. Current Outpatient Medications   Medication Sig Dispense Refill    pantoprazole (PROTONIX) 40 mg tablet Take 1 Tablet by mouth daily. 30 Tablet 1    sucralfate (CARAFATE) 100 mg/mL suspension Take 10 mL by mouth four (4) times daily as needed for GI Pain, Nausea or GI Distress. 414 mL 0    spironolactone (ALDACTONE) 50 mg tablet Take 1.5 Tablets by mouth daily. 45 Tablet 2    potassium chloride (KLOR-CON M10) 10 mEq tablet Take 2 Tablets by mouth daily. Indications: low amount of potassium in the blood 60 Tablet 2    cholecalciferol (VITAMIN D3) (1000 Units /25 mcg) tablet Take  by mouth daily. MULTIVITAMIN PO Take  by mouth.      levonorgestreL (MIRENA) 20 mcg/24 hours (8 yrs) 52 mg IUD 1 Device by IntraUTERine route once. The past medical history, past surgical history, and family history were reviewed and updated in the medical record. Lab values/Imaging were reviewed. The medications were reviewed and updated in the medical record. Immunizations were reviewed and updated in the medical record. All relevant preventative screenings reviewed and updated in the medical record.   REVIEW OF SYSTEMS   Review of Systems   Constitutional:  Negative for chills, diaphoresis, fever and malaise/fatigue. HENT:  Positive for sore throat. Negative for congestion and sinus pain. Respiratory:  Negative for cough, sputum production, shortness of breath and wheezing. Gastrointestinal:  Positive for heartburn. Negative for abdominal pain, blood in stool, constipation, diarrhea, melena, nausea and vomiting. Neurological:  Negative for dizziness, weakness and headaches. Endo/Heme/Allergies:  Negative for environmental allergies. PHYSICAL EXAM   /85 (BP 1 Location: Left upper arm, BP Patient Position: Sitting, BP Cuff Size: Adult long)   Pulse (!) 101   Temp 98.6 °F (37 °C) (Temporal)   Resp 12   Ht 5' 7\" (1.702 m)   Wt 228 lb (103.4 kg)   LMP  (Exact Date)   SpO2 98%   BMI 35.71 kg/m²      Physical Exam  Constitutional:       General: She is not in acute distress. Appearance: She is not ill-appearing or diaphoretic. HENT:      Right Ear: Tympanic membrane, ear canal and external ear normal.      Left Ear: Tympanic membrane, ear canal and external ear normal.      Nose: No congestion or rhinorrhea. Mouth/Throat:      Pharynx: No oropharyngeal exudate or posterior oropharyngeal erythema. Cardiovascular:      Rate and Rhythm: Normal rate and regular rhythm. Pulses: Normal pulses. Heart sounds: Normal heart sounds. No murmur heard. Pulmonary:      Effort: Pulmonary effort is normal.      Breath sounds: Normal breath sounds. Abdominal:      General: Abdomen is flat. Bowel sounds are normal. There is no distension. Palpations: Abdomen is soft. There is no mass. Tenderness: There is no abdominal tenderness. There is no guarding or rebound. Hernia: No hernia is present. Musculoskeletal:      Cervical back: Normal range of motion and neck supple. No tenderness. Lymphadenopathy:      Cervical: No cervical adenopathy. Skin:     Capillary Refill: Capillary refill takes less than 2 seconds.    Neurological:      Mental Status: She is alert. Psychiatric:         Mood and Affect: Mood normal.          ASSESSMENT/ PLAN   Below is the assessment and plan developed based on review of pertinent history, physical exam, labs, studies, and medications. 1. Pharyngoesophageal dysphagia  -     pantoprazole (PROTONIX) 40 mg tablet; Take 1 Tablet by mouth daily. , Normal, Disp-30 Tablet, R-1Appointment needed for future refills. -     sucralfate (CARAFATE) 100 mg/mL suspension; Take 10 mL by mouth four (4) times daily as needed for GI Pain, Nausea or GI Distress., Normal, Disp-414 mL, R-0Appointment needed for future refills.  -     REFERRAL TO GASTROENTEROLOGY  -     H PYLORI AB, IGG, QT; Future  2. Heartburn  -     pantoprazole (PROTONIX) 40 mg tablet; Take 1 Tablet by mouth daily. , Normal, Disp-30 Tablet, R-1Appointment needed for future refills. -     sucralfate (CARAFATE) 100 mg/mL suspension; Take 10 mL by mouth four (4) times daily as needed for GI Pain, Nausea or GI Distress., Normal, Disp-414 mL, R-0Appointment needed for future refills.  -     REFERRAL TO GASTROENTEROLOGY  -     H PYLORI AB, IGG, QT; Future  3. Food intolerance  -     LACTOSE TOLERANCE TEST; Future  -     CELIAC DISEASE PROFILE (REFLEX TTG, IGG); Future  -     REFERRAL TO GASTROENTEROLOGY  4. Family history of celiac disease  -     CELIAC DISEASE PROFILE (REFLEX TTG, IGG); Future  -     REFERRAL TO GASTROENTEROLOGY  5. Encounter for immunization  -     INFLUENZA, FLUARIX, FLULAVAL, FLUZONE (AGE 6 MO+), AFLURIA(AGE 3Y+) IM, PF, 0.5 ML  6. Encounter for hepatitis C screening test for low risk patient  -     HEPATITIS C AB; Future     Patient likely needs a EGD. Proper precautions discussed with the patient. Follow-up and Dispositions    Return in about 4 weeks (around 12/20/2022) for Re-assess complaints w/ add of Protonix and prn Carafate.          Disclaimer:  Advised patient to call back or return to office if symptoms worsen/change/persist.  Discussed expected course/resolution/complications of diagnosis in detail with patient. Medication risks/benefits/alternatives discussed with patient. Patient was given an after visit summary which includes diagnoses, current medications, & vitals. Discussed patient instructions and advised to read to all patient instructions regarding care. Patient expressed understanding with the diagnosis and plan.        Harrold Aschoff, NP  11/22/2022

## 2022-12-15 ENCOUNTER — OFFICE VISIT (OUTPATIENT)
Dept: PRIMARY CARE CLINIC | Age: 32
End: 2022-12-15
Payer: COMMERCIAL

## 2022-12-15 VITALS
TEMPERATURE: 97.5 F | DIASTOLIC BLOOD PRESSURE: 89 MMHG | OXYGEN SATURATION: 97 % | HEIGHT: 67 IN | BODY MASS INDEX: 35.79 KG/M2 | SYSTOLIC BLOOD PRESSURE: 156 MMHG | WEIGHT: 228 LBS | RESPIRATION RATE: 18 BRPM | HEART RATE: 86 BPM

## 2022-12-15 DIAGNOSIS — Z13.220 ENCOUNTER FOR LIPID SCREENING FOR CARDIOVASCULAR DISEASE: ICD-10-CM

## 2022-12-15 DIAGNOSIS — Z13.6 ENCOUNTER FOR LIPID SCREENING FOR CARDIOVASCULAR DISEASE: ICD-10-CM

## 2022-12-15 DIAGNOSIS — Z11.3 SCREENING FOR STD (SEXUALLY TRANSMITTED DISEASE): ICD-10-CM

## 2022-12-15 DIAGNOSIS — I10 UNCONTROLLED HYPERTENSION: Primary | ICD-10-CM

## 2022-12-15 DIAGNOSIS — Z00.00 WELL WOMAN EXAM (NO GYNECOLOGICAL EXAM): ICD-10-CM

## 2022-12-15 DIAGNOSIS — D75.839 THROMBOCYTOSIS: ICD-10-CM

## 2022-12-15 RX ORDER — SPIRONOLACTONE 50 MG/1
50 TABLET, FILM COATED ORAL DAILY
Qty: 90 TABLET | Refills: 1 | Status: SHIPPED | OUTPATIENT
Start: 2022-12-15

## 2022-12-15 NOTE — PROGRESS NOTES
HPI:  Brian Saldaña is a 34 y.o. female presenting for well woman exam.     She has not been taking her Spironolactone or her K supplement for months. She has a hx of low K. Hx of thrombocytosis on labs. Has not seen Heme or had workup for this per patient. Diet: normal  Exercise: not currently  Tobacco Use: former smoker, quit 8 years ago   Alcohol: socially  Sexually active: yes  Desires STD testing: yes    Allergies- reviewed: Allergies   Allergen Reactions    Augmentin [Amoxicillin-Pot Clavulanate] Hives       Medications- reviewed:   Current Outpatient Medications   Medication Sig    spironolactone (ALDACTONE) 50 mg tablet Take 1 Tablet by mouth daily. cholecalciferol (VITAMIN D3) (1000 Units /25 mcg) tablet Take  by mouth daily. MULTIVITAMIN PO Take  by mouth.    levonorgestreL (MIRENA) 20 mcg/24 hours (8 yrs) 52 mg IUD 1 Device by IntraUTERine route once. pantoprazole (PROTONIX) 40 mg tablet Take 1 Tablet by mouth daily. (Patient not taking: Reported on 12/15/2022)    sucralfate (CARAFATE) 100 mg/mL suspension Take 10 mL by mouth four (4) times daily as needed for GI Pain, Nausea or GI Distress. (Patient not taking: Reported on 12/15/2022)     No current facility-administered medications for this visit.          Past Medical History- reviewed:  Past Medical History:   Diagnosis Date    HPV in female     Hypertension          Past Surgical History- reviewed:   Past Surgical History:   Procedure Laterality Date    HX OTHER SURGICAL  2001    multiple calcium deposit cyst removals         Family History - reviewed:  Family History   Problem Relation Age of Onset    Cancer Mother     Diabetes Father     Hypertension Brother          Social History - reviewed:  Social History     Socioeconomic History    Marital status: SINGLE     Spouse name: Not on file    Number of children: Not on file    Years of education: Not on file    Highest education level: Not on file   Occupational History    Not on Pt's wife calling stating she would like to move up Chris's appt with Dr Nava as it is \"very important\" and involves what they were told by Dr Mcnamara.  Noted f/u scheduled with Dr Nava for 5/4.   Pt is also scheduled for partial penectomy with Dr Mcnamara on 3/29. Will discuss with MD and update pt's wife.     Discussed with Dr Nava.  Pt does need to have this surgery per Dr Nava.  Can see next week to discuss.      Called Iris - relayed above.  Was informed by Josephine that they are seeking a second opinion at [facility] on 3/30 with another urologist. Pt has currently cancelled his surgery with Dr Mcnamara until he has a second opinion and speaks with Dr Nava as well.    Adjusted pt's appt to see Dr Nava.  Pt's wife has no further questions at this time.   file   Tobacco Use    Smoking status: Former     Packs/day: 0.50     Years: 4.00     Pack years: 2.00     Types: Cigarettes     Start date: 7/10/2011     Quit date: 7/10/2015     Years since quittin.4    Smokeless tobacco: Never   Vaping Use    Vaping Use: Never used   Substance and Sexual Activity    Alcohol use: Yes    Drug use: Never    Sexual activity: Not Currently   Other Topics Concern    Not on file   Social History Narrative    Not on file     Social Determinants of Health     Financial Resource Strain: Low Risk     Difficulty of Paying Living Expenses: Not hard at all   Food Insecurity: No Food Insecurity    Worried About Running Out of Food in the Last Year: Never true    Ran Out of Food in the Last Year: Never true   Transportation Needs: Not on file   Physical Activity: Unknown    Days of Exercise per Week: 0 days    Minutes of Exercise per Session: Not on file   Stress: Not on file   Social Connections: Not on file   Intimate Partner Violence: Not At Risk    Fear of Current or Ex-Partner: No    Emotionally Abused: No    Physically Abused: No    Sexually Abused: No   Housing Stability: Not on file         Immunizations - reviewed:   Immunization History   Administered Date(s) Administered    COVID-19, MODERNA BLUE border, Primary or Immunocompromised, (age 18y+), IM, 100 mcg/0.5mL 03/10/2021, 2021    Influenza, FLUARIX, FLULAVAL, FLUZONE (age 10 mo+) AND AFLURIA, (age 1 y+), PF, 0.5mL 2022    Td 2020    Tdap 2018     Flu: UTD  Tdap: UTD  Pneumovax: NA  Zostervax: NA    Health Maintenance reviewed -  Pap smear - , [facility] to [facility], normal   Mammogram - Mom has stage 4 breast cancer at 62 YO, has already started mammograms and they are fine, Mom had genetic testing that was negative  Colonoscopy - no fam hx   DEXA scan NA  Lung cancer screening NA    Review of Systems   Denies fever, chills, sore throat, cough, chest pain, shortness of breath, abd pain, dysuria, hematuria, breast masses, nipple discharge    Physical Exam  Visit Vitals  BP (!) 156/89   Pulse 86   Temp 97.5 °F (36.4 °C) (Temporal)   Resp 18   Ht 5' 7\" (1.702 m)   Wt 228 lb (103.4 kg)   SpO2 97%   BMI 35.71 kg/m²       General appearance - alert, well appearing, and in no distress  Eyes - sclera anicteric, left eye normal, right eye normal  Ears - bilateral TM's and external ear canals normal  Nose - normal and patent, no erythema, discharge or polyps  Mouth - mucous membranes moist, pharynx normal without lesions  Neck - supple, no significant adenopathy  Chest - clear to auscultation, no wheezes, rales or rhonchi, symmetric air entry  Heart - normal rate, regular rhythm, normal S1, S2, no murmurs, rubs, clicks or gallops  Abdomen - soft, nontender, nondistended, no masses or organomegaly  Neurological - alert, oriented, normal speech, no focal findings or movement disorder noted  Musculoskeletal - full range of motion without pain  Extremities - no pedal edema noted  Skin - normal coloration and turgor, no rashes, no suspicious skin lesions noted  Pelvic - Not examined   Breast - Not examined     Assessment/Plan:    ICD-10-CM ICD-9-CM    1. Uncontrolled hypertension  N20 811.8 METABOLIC PANEL, COMPREHENSIVE      METABOLIC PANEL, COMPREHENSIVE      2. Encounter for lipid screening for cardiovascular disease  Z13.220 V77.91 LIPID PANEL    Z13.6 V81.2 LIPID PANEL      3. Thrombocytosis  D75.839 238.71 CBC WITH AUTOMATED DIFF      PATHOLOGIST REVIEW SMEARS      CBC WITH AUTOMATED DIFF      4. Well woman exam (no gynecological exam)  Z00.00 V70.0       5.  Screening for STD (sexually transmitted disease)  Z11.3 V74.5 HIV 1/2 AG/AB, 4TH GENERATION,W RFLX CONFIRM      HEPATITIS C AB      HEP B SURFACE AG      RPR      CT/NG/T.VAGINALIS AMPLIFICATION      HIV 1/2 AG/AB, 4TH GENERATION,W RFLX CONFIRM      HEPATITIS C AB      HEP B SURFACE AG      RPR      CT/NG/T.VAGINALIS AMPLIFICATION        Given she has not been on K for many months will check K level first. If normal will start back on Spironolactone for her BP first and monitor. Instructed to follow up in 2-3 weeks. I have discussed the diagnosis with the patient and the intended plan as seen in the above orders. Patient verbalized understanding of the plan and agrees with the plan. The patient has received an after-visit summary and questions were answered concerning future plans. I have discussed medication side effects and warnings with the patient as well. Informed patient to return to the office if new symptoms arise.     Sha Arcos, DO

## 2022-12-15 NOTE — PROGRESS NOTES
Chief Complaint   Patient presents with    Physical        Identified pt with two pt identifiers(name and ). Chief Complaint   Patient presents with    Physical        3 most recent PHQ Screens 2022   Little interest or pleasure in doing things Not at all   Feeling down, depressed, irritable, or hopeless Not at all   Total Score PHQ 2 0        There were no vitals filed for this visit. Health Maintenance Due   Topic Date Due    Cervical cancer screen  Never done    COVID-19 Vaccine (3 - Booster for Moderna series) 2021        1. Have you been to the ER, urgent care clinic since your last visit? Hospitalized since your last visit? No    2. Have you seen or consulted any other health care providers outside of the [address] [place] [address] since your last visit? Include any pap smears or colon screening. No    3. For patients aged 39-70: Has the patient had a colonoscopy / FIT/ Cologuard? NA - based on age      If the patient is female:    4. For patients aged 41-77: Has the patient had a mammogram within the past 2 years? NA - based on age or sex      11. For patients aged 21-65: Has the patient had a pap smear?  Yes - no Care Gap present

## 2022-12-19 DIAGNOSIS — D75.839 THROMBOCYTOSIS: Primary | ICD-10-CM

## 2022-12-21 ENCOUNTER — TRANSCRIBE ORDER (OUTPATIENT)
Dept: SCHEDULING | Age: 32
End: 2022-12-21

## 2022-12-21 DIAGNOSIS — R09.89 GLOBUS SENSATION: Primary | ICD-10-CM

## 2022-12-21 DIAGNOSIS — R12 HEARTBURN: ICD-10-CM

## 2022-12-21 DIAGNOSIS — D75.839 THROMBOCYTOSIS: ICD-10-CM

## 2022-12-21 DIAGNOSIS — I10 HTN (HYPERTENSION): ICD-10-CM

## 2022-12-29 ENCOUNTER — HOSPITAL ENCOUNTER (OUTPATIENT)
Dept: GENERAL RADIOLOGY | Age: 32
Discharge: HOME | End: 2022-12-29
Attending: INTERNAL MEDICINE
Payer: COMMERCIAL

## 2022-12-29 DIAGNOSIS — R09.89 GLOBUS SENSATION: ICD-10-CM

## 2022-12-29 DIAGNOSIS — I10 HTN (HYPERTENSION): ICD-10-CM

## 2022-12-29 DIAGNOSIS — R12 HEARTBURN: ICD-10-CM

## 2022-12-29 DIAGNOSIS — D75.839 THROMBOCYTOSIS: ICD-10-CM

## 2022-12-29 PROCEDURE — 74220 X-RAY XM ESOPHAGUS 1CNTRST: CPT

## 2023-01-04 DIAGNOSIS — E83.51 HYPOCALCEMIA: Primary | ICD-10-CM

## 2023-01-06 ENCOUNTER — OFFICE VISIT (OUTPATIENT)
Dept: PRIMARY CARE CLINIC | Age: 33
End: 2023-01-06
Payer: COMMERCIAL

## 2023-01-06 VITALS
BODY MASS INDEX: 35.31 KG/M2 | OXYGEN SATURATION: 98 % | WEIGHT: 225 LBS | SYSTOLIC BLOOD PRESSURE: 129 MMHG | DIASTOLIC BLOOD PRESSURE: 85 MMHG | RESPIRATION RATE: 17 BRPM | TEMPERATURE: 97.6 F | HEART RATE: 75 BPM | HEIGHT: 67 IN

## 2023-01-06 DIAGNOSIS — Z13.6 ENCOUNTER FOR LIPID SCREENING FOR CARDIOVASCULAR DISEASE: ICD-10-CM

## 2023-01-06 DIAGNOSIS — D75.839 THROMBOCYTOSIS: ICD-10-CM

## 2023-01-06 DIAGNOSIS — Z13.220 ENCOUNTER FOR LIPID SCREENING FOR CARDIOVASCULAR DISEASE: ICD-10-CM

## 2023-01-06 DIAGNOSIS — I10 PRIMARY HYPERTENSION: Primary | ICD-10-CM

## 2023-01-06 NOTE — PROGRESS NOTES
HPI     Chief Complaint   Patient presents with    Follow-up    Hypertension     Bp check     She is a 34 y.o. female who presents for follow up. HTN - Resumed Spironolactone 50mg daily. BP has been looking better. Labs have not been completed. Thrombocytosis - She was referred to Dr. Faith Alvarenga. She has appt 1/17/23. Path thought the smear looked reactive in nature. Last . Has been elevated since 2010. Review of Systems   Eyes:  Negative for visual disturbance. Respiratory:  Negative for shortness of breath. Cardiovascular:  Negative for chest pain. Neurological:  Negative for headaches. Reviewed PmHx, RxHx, FmHx, SocHx, AllgHx and updated and dated in the chart. Physical Exam:  Visit Vitals  /85 (BP 1 Location: Right upper arm, BP Patient Position: Sitting, BP Cuff Size: Large adult)   Pulse 75   Temp 97.6 °F (36.4 °C) (Temporal)   Resp 17   Ht 5' 7\" (1.702 m)   Wt 225 lb (102.1 kg)   SpO2 98%   BMI 35.24 kg/m²     Physical Exam  Vitals and nursing note reviewed. Constitutional:       General: She is not in acute distress. Appearance: Normal appearance. She is not ill-appearing. Cardiovascular:      Rate and Rhythm: Normal rate and regular rhythm. Heart sounds: No murmur heard. Pulmonary:      Effort: Pulmonary effort is normal. No respiratory distress. Breath sounds: Normal breath sounds. No wheezing, rhonchi or rales. Neurological:      General: No focal deficit present. Mental Status: She is alert. Psychiatric:         Mood and Affect: Mood normal.         Behavior: Behavior normal.     No results found for this or any previous visit (from the past 12 hour(s)). Assessment / Plan     Diagnoses and all orders for this visit:    1. Primary hypertension    2. Encounter for lipid screening for cardiovascular disease  -     LIPID PANEL; Future    3. Thrombocytosis     Continue Spironolactone. Get labs in system done today.   Encouraged to follow up with Heme regarding her PLT count as scheduled. I have discussed the diagnosis with the patient and the intended plan as seen in the above orders. The patient has received an after-visit summary and questions were answered concerning future plans. I have discussed medication side effects and warnings with the patient as well.     Yina [place], DO

## 2023-01-06 NOTE — PROGRESS NOTES
Chief Complaint   Patient presents with    Follow-up    Hypertension     Bp check    Identified pt with two pt identifiers(name and ). Chief Complaint   Patient presents with    Follow-up    Hypertension     Bp check        3 most recent PHQ Screens 12/15/2022   Little interest or pleasure in doing things Not at all   Feeling down, depressed, irritable, or hopeless Not at all   Total Score PHQ 2 0        There were no vitals filed for this visit. Health Maintenance Due   Topic Date Due    Cervical cancer screen  Never done    COVID-19 Vaccine (3 - Booster for Moderna series) 2021        1. Have you been to the ER, urgent care clinic since your last visit? Hospitalized since your last visit? No    2. Have you seen or consulted any other health care providers outside of the [facility] since your last visit? Include any pap smears or colon screening. No    3. For patients aged 39-70: Has the patient had a colonoscopy / FIT/ Cologuard? NA - based on age      If the patient is female:    4. For patients aged 41-77: Has the patient had a mammogram within the past 2 years? NA - based on age or sex      11. For patients aged 21-65: Has the patient had a pap smear?  Yes - no Care Gap present

## 2023-01-17 ENCOUNTER — VIRTUAL VISIT (OUTPATIENT)
Dept: ONCOLOGY | Age: 33
End: 2023-01-17
Payer: COMMERCIAL

## 2023-01-17 ENCOUNTER — TELEPHONE (OUTPATIENT)
Dept: ONCOLOGY | Age: 33
End: 2023-01-17

## 2023-01-17 DIAGNOSIS — E66.09 CLASS 2 OBESITY DUE TO EXCESS CALORIES WITHOUT SERIOUS COMORBIDITY WITH BODY MASS INDEX (BMI) OF 35.0 TO 35.9 IN ADULT: ICD-10-CM

## 2023-01-17 DIAGNOSIS — D75.839 THROMBOCYTOSIS: Primary | ICD-10-CM

## 2023-01-17 PROBLEM — E66.812 CLASS 2 OBESITY DUE TO EXCESS CALORIES WITHOUT SERIOUS COMORBIDITY WITH BODY MASS INDEX (BMI) OF 35.0 TO 35.9 IN ADULT: Status: ACTIVE | Noted: 2023-01-17

## 2023-01-17 PROCEDURE — 99204 OFFICE O/P NEW MOD 45 MIN: CPT | Performed by: INTERNAL MEDICINE

## 2023-01-17 NOTE — PROGRESS NOTES
[facility] at [address], [address] [place], [address] [facility]: [phone]  F: [phone]    Reason for Visit:   Jacqui Gordon is a 34 y.o. female who is seen in consultation at the request of Dr. Ramy Herring for evaluation of thrombocytosis     Jacqui Gordon is a 34 y.o. female who is seen by synchronous (real-time) audio-video technology on 2023    History of Present Illness:   Patient is a 34 y.o. female seen for above. She has had a platelet count of over 450k since 2022  No other CBC abnormalities   She states that she has known that she has slightly high platelet counts for 5 years. She has an IUD, has no menorrhagia for 7 years  She has no fatigue, no h/o DVT  She does not smoke and no FH blood disease. Past Medical History:   Diagnosis Date    HPV in female     Hypertension       Past Surgical History:   Procedure Laterality Date    HX OTHER SURGICAL      multiple calcium deposit cyst removals      Social History     Tobacco Use    Smoking status: Former     Packs/day: 0.50     Years: 4.00     Pack years: 2.00     Types: Cigarettes     Start date: 7/10/2011     Quit date: 7/10/2015     Years since quittin.5    Smokeless tobacco: Never   Substance Use Topics    Alcohol use: Yes      Family History   Problem Relation Age of Onset    Cancer Mother     Diabetes Father     Hypertension Brother      Current Outpatient Medications   Medication Sig    spironolactone (ALDACTONE) 50 mg tablet Take 1 Tablet by mouth daily. pantoprazole (PROTONIX) 40 mg tablet Take 1 Tablet by mouth daily. sucralfate (CARAFATE) 100 mg/mL suspension Take 10 mL by mouth four (4) times daily as needed for GI Pain, Nausea or GI Distress. cholecalciferol (VITAMIN D3) (1000 Units /25 mcg) tablet Take  by mouth daily. MULTIVITAMIN PO Take  by mouth.    levonorgestreL (MIRENA) 20 mcg/24 hours (8 yrs) 52 mg IUD 1 Device by IntraUTERine route once.      No current facility-administered medications for this visit. Allergies   Allergen Reactions    Augmentin [Amoxicillin-Pot Clavulanate] Hives        Review of Systems: A complete review of systems was obtained, negative except as described above. Physical Exam:     Due to this being a TeleHealth evaluation, many elements of the physical examination are unable to be assessed. Constitutional: Appears well-developed and well-nourished in no apparent distress   Mental status: Alert and awake, Oriented to person/place/time, Able to follow commands  Eyes: EOM normal, Sclera normal, No visible ocular discharge  HENT: Normocephalic, atraumatic; Mouth/Throat: Moist mucous membranes, External Ears normal  Neck: No visualized mass  Pulmonary/Chest: Respiratory effort normal, No visualized signs of difficulty breathing or respiratory distress   Musculoskeletal: Normal gait with no signs of ataxia, Normal range of motion of neck  Neurological: No facial asymmetry (Cranial nerve 7 motor function), No gaze palsy  Skin: No significant exanthematous lesions or discoloration noted on facial skin  Psychiatric: Normal affect, normal judgment/insight. No hallucinations      Results:     Lab Results   Component Value Date/Time    WBC 7.5 01/06/2023 12:00 AM    HGB 14.0 01/06/2023 12:00 AM    HCT 40.9 01/06/2023 12:00 AM    PLATELET 393 (H) 07/28/2314 12:00 AM    MCV 93 01/06/2023 12:00 AM    ABS.  NEUTROPHILS 3.4 01/06/2023 12:00 AM     Lab Results   Component Value Date/Time    Sodium 140 09/26/2022 09:15 AM    Potassium 3.5 09/26/2022 09:15 AM    Chloride 106 09/26/2022 09:15 AM    CO2 23 09/26/2022 09:15 AM    Glucose 94 09/26/2022 09:15 AM    BUN 9 09/26/2022 09:15 AM    Creatinine 0.66 09/26/2022 09:15 AM    GFR est AA >60 09/26/2022 09:15 AM    GFR est non-AA >60 09/26/2022 09:15 AM    Calcium 7.6 (L) 09/26/2022 09:15 AM     Lab Results   Component Value Date/Time    Bilirubin, total 0.3 09/26/2022 09:15 AM    ALT (SGPT) 52 09/26/2022 09:15 AM    Alk. phosphatase 57 09/26/2022 09:15 AM    Protein, total 6.7 09/26/2022 09:15 AM    Albumin 3.4 (L) 09/26/2022 09:15 AM    Globulin 3.3 09/26/2022 09:15 AM       Lab Results   Component Value Date/Time    Lipase 123 02/14/2022 01:32 PM    Hep C Virus Ab <0.1 01/06/2023 12:00 AM    HIV SCREEN 4TH GENERATION WRFX Non Reactive 01/06/2023 12:00 AM       Lab Results   Component Value Date/Time    D-dimer 0.31 09/26/2022 09:15 AM       Records reviewed and summarized above. Pathology report(s) reviewed above. Radiology report(s) reviewed above. Assessment:   1) Thrombocytosis- persistent  Isolated  Reactive vs autonomous  Work up as below  If uninformative will observe     2) Obesity    3) GERD    4) Psychosocial  Coping well       Plan:     CBC DIFF , SMEAR, IRON PROFILE, FERRITIN, JAK2, BCR ABL    RTC 5 weeks VV     I appreciate the opportunity to participate in Ms. Amada Lee's care. Signed By: Stuart Essex, MD      The patient was evaluated through a synchronous (real-time) audio-video encounter. The patient (or guardian if applicable) is aware that this is a billable service, which includes applicable co-pays. This Virtual Visit was conducted with patient's (and/or legal guardian's) consent. The visit was conducted pursuant to the emergency declaration under the [address] [facility] authority and the [address] and [employer] Act. Patient identification was verified, and a caregiver was present when appropriate. The patient was located in a state where the provider was licensed to provide care.

## 2023-02-20 ENCOUNTER — TELEPHONE (OUTPATIENT)
Dept: ONCOLOGY | Age: 33
End: 2023-02-20

## 2023-02-20 NOTE — TELEPHONE ENCOUNTER
Per AbR, called patient regarding reschedule from 2/21/23 (Virtual) to 3/16/23 @ 10:45am (Virtual), due to no labs. No Answer, left message for patient to call the office to confirm new date/time.

## 2023-02-21 ENCOUNTER — VIRTUAL VISIT (OUTPATIENT)
Dept: ONCOLOGY | Age: 33
End: 2023-02-21
Payer: COMMERCIAL

## 2023-02-21 DIAGNOSIS — D75.839 THROMBOCYTOSIS: Primary | ICD-10-CM

## 2023-02-21 PROCEDURE — 99214 OFFICE O/P EST MOD 30 MIN: CPT | Performed by: INTERNAL MEDICINE

## 2023-02-21 NOTE — PROGRESS NOTES
[facility] at [place] [address], [place]: [phone]  F: [phone]    Reason for Visit:   Columba Verduzco is a 34 y.o. female who is seen for follow up of thrombocytosis     Columba Verduzco is a 34 y.o. female who is seen by synchronous (real-time) audio-video technology on 2023    History of Present Illness:   Patient is a 34 y.o. female seen for above. She has had a platelet count of over 450k since 2022  No other CBC abnormalities   She states that she has known that she has slightly high platelet counts for 5 years. She has an IUD, has no menorrhagia for 7 years  She has no fatigue, no h/o DVT  She does not smoke and no FH blood disease. She is seen to review labs. Past Medical History:   Diagnosis Date    HPV in female     Hypertension       Past Surgical History:   Procedure Laterality Date    HX OTHER SURGICAL      multiple calcium deposit cyst removals      Social History     Tobacco Use    Smoking status: Former     Packs/day: 0.50     Years: 4.00     Pack years: 2.00     Types: Cigarettes     Start date: 7/10/2011     Quit date: 7/10/2015     Years since quittin.6    Smokeless tobacco: Never   Substance Use Topics    Alcohol use: Yes      Family History   Problem Relation Age of Onset    Cancer Mother     Diabetes Father     Hypertension Brother      Current Outpatient Medications   Medication Sig    spironolactone (ALDACTONE) 50 mg tablet Take 1 Tablet by mouth daily. pantoprazole (PROTONIX) 40 mg tablet Take 1 Tablet by mouth daily. sucralfate (CARAFATE) 100 mg/mL suspension Take 10 mL by mouth four (4) times daily as needed for GI Pain, Nausea or GI Distress. cholecalciferol (VITAMIN D3) (1000 Units /25 mcg) tablet Take  by mouth daily. MULTIVITAMIN PO Take  by mouth.    levonorgestreL (MIRENA) 20 mcg/24 hours (8 yrs) 52 mg IUD 1 Device by IntraUTERine route once.      No current facility-administered medications for this visit. Allergies   Allergen Reactions    Augmentin [Amoxicillin-Pot Clavulanate] Hives        Review of Systems: A complete review of systems was obtained, negative except as described above. Physical Exam:     Due to this being a TeleHealth evaluation, many elements of the physical examination are unable to be assessed. Constitutional: Appears well-developed and well-nourished in no apparent distress   Mental status: Alert and awake, Oriented to person/place/time, Able to follow commands  Eyes: EOM normal, Sclera normal, No visible ocular discharge  HENT: Normocephalic, atraumatic; Mouth/Throat: Moist mucous membranes, External Ears normal  Neck: No visualized mass  Pulmonary/Chest: Respiratory effort normal, No visualized signs of difficulty breathing or respiratory distress   Musculoskeletal: Normal gait with no signs of ataxia, Normal range of motion of neck  Neurological: No facial asymmetry (Cranial nerve 7 motor function), No gaze palsy  Skin: No significant exanthematous lesions or discoloration noted on facial skin  Psychiatric: Normal affect, normal judgment/insight. No hallucinations      Results:     Lab Results   Component Value Date/Time    WBC 7.5 01/06/2023 12:00 AM    HGB 14.0 01/06/2023 12:00 AM    HCT 40.9 01/06/2023 12:00 AM    PLATELET 483 (H) 00/91/8166 12:00 AM    MCV 93 01/06/2023 12:00 AM    ABS. NEUTROPHILS 3.4 01/06/2023 12:00 AM     Lab Results   Component Value Date/Time    Sodium 140 09/26/2022 09:15 AM    Potassium 3.5 09/26/2022 09:15 AM    Chloride 106 09/26/2022 09:15 AM    CO2 23 09/26/2022 09:15 AM    Glucose 94 09/26/2022 09:15 AM    BUN 9 09/26/2022 09:15 AM    Creatinine 0.66 09/26/2022 09:15 AM    GFR est AA >60 09/26/2022 09:15 AM    GFR est non-AA >60 09/26/2022 09:15 AM    Calcium 7.6 (L) 09/26/2022 09:15 AM     Lab Results   Component Value Date/Time    Bilirubin, total 0.3 09/26/2022 09:15 AM    ALT (SGPT) 52 09/26/2022 09:15 AM    Alk. phosphatase 57 09/26/2022 09:15 AM    Protein, total 6.7 09/26/2022 09:15 AM    Albumin 3.4 (L) 09/26/2022 09:15 AM    Globulin 3.3 09/26/2022 09:15 AM       Lab Results   Component Value Date/Time    Lipase 123 02/14/2022 01:32 PM    Hep C Virus Ab <0.1 01/06/2023 12:00 AM    HIV SCREEN 4TH GENERATION WRFX Non Reactive 01/06/2023 12:00 AM       Lab Results   Component Value Date/Time    D-dimer 0.31 09/26/2022 09:15 AM     External records under media were reviewed  1/2023  Ferritin 102 , iron saturation 20%  JAK2, JAK2 exon 12, MPL, CALR are all negative  Records reviewed and summarized above. Pathology report(s) reviewed above. Radiology report(s) reviewed above. Assessment:   1) Thrombocytosis- persistent  Isolated  Reactive vs autonomous  Sent and reviewed. Extended mutational profiling negative not iron deficient. Testing is not definitive to rule out myeloproliferative disorders but suspicion is low. I suspect this is reactive thrombocytosis. We discussed continuing to observe. If it rises beyond 500,000 we will consider a bone marrow biopsy. 2) Obesity    3) GERD    4) Psychosocial  Coping well       Plan:     Return to clinic in 6 months with CBC differential      I appreciate the opportunity to participate in Ms. Edgardo Lee's care. Signed By: Shad Zheng MD      The patient was evaluated through a synchronous (real-time) audio-video encounter. The patient (or guardian if applicable) is aware that this is a billable service, which includes applicable co-pays. This Virtual Visit was conducted with patient's (and/or legal guardian's) consent. The visit was conducted pursuant to the emergency declaration under the [facility], [facility] waiver authority and the [employer] and [employer] Act. Patient identification was verified, and a caregiver was present when appropriate.  The patient was located in a state where the provider was licensed to provide care.

## 2023-04-04 ENCOUNTER — OFFICE VISIT (OUTPATIENT)
Dept: PRIMARY CARE CLINIC | Age: 33
End: 2023-04-04
Payer: COMMERCIAL

## 2023-04-04 PROCEDURE — 99213 OFFICE O/P EST LOW 20 MIN: CPT | Performed by: FAMILY MEDICINE

## 2023-04-04 RX ORDER — CYCLOBENZAPRINE HCL 10 MG
10 TABLET ORAL
Qty: 7 TABLET | Refills: 0 | Status: SHIPPED
Start: 2023-04-04

## 2023-04-04 NOTE — PROGRESS NOTES
HPI     Chief Complaint   Patient presents with    Other     Swollen lymph node on neck, noticed 2-3 mos ago. Shoulder Pain     States she is having right shoulder pain than travels down arm sometimes     She is a 34 y.o. female who presents for swollen lymph node. States she has felt a swollen lymph node in her R occipital region for 2-3 months. Has not increased in size. Non tender but bothersome to patient. She denies any recent sick symptoms or weight loss. She endorses R shoulder/ upper trap pain x 1 month. Denies neck pain or numbness. She is able to move the arm fully. Denies injury or trauma. Review of Systems   Constitutional:  Negative for chills and fever. HENT:  Negative for ear pain and sore throat. Musculoskeletal:  Negative for neck pain. Neurological:  Negative for weakness and numbness. Reviewed PmHx, RxHx, FmHx, SocHx, AllgHx and updated and dated in the chart. Physical Exam:  Visit Vitals  /83   Pulse 98   Temp 97.8 °F (36.6 °C) (Temporal)   Resp 18   Ht 5' 7\" (1.702 m)   Wt 224 lb (101.6 kg)   SpO2 96%   BMI 35.08 kg/m²     Physical Exam  Vitals and nursing note reviewed. Constitutional:       General: She is not in acute distress. Appearance: Normal appearance. She is not ill-appearing. HENT:      Right Ear: Tympanic membrane normal.      Left Ear: Tympanic membrane normal.      Nose: No rhinorrhea. Mouth/Throat:      Pharynx: No posterior oropharyngeal erythema. Comments: She has a small lesion on L tongue. Neck:      Comments: She has tenderness on R occipital region to touch. No cervical LAD appreciated. Cardiovascular:      Rate and Rhythm: Normal rate and regular rhythm. Heart sounds: No murmur heard. Pulmonary:      Effort: Pulmonary effort is normal. No respiratory distress. Breath sounds: Normal breath sounds. No wheezing, rhonchi or rales. Musculoskeletal:      Comments: Full ROM of both shoulders and neck. Strength intact BL in upper ext. Gross sensation intact. Impingement testing and empty cans negative on R. She has tight upper traps. Neurological:      Mental Status: She is alert. No results found for this or any previous visit (from the past 12 hour(s)). Assessment / Plan     Diagnoses and all orders for this visit:    1. Neck pain on right side - Will get ultrasound of area of concern. -     US HEAD NECK SOFT TISSUE; Future    2. Muscle spasm - She has taken Flexeril before. Discussed not to drive or drink on medication. Given home exercises. We discussed imaging but she would like to hold off on this. Follow up if not improving to consider shoulder XR, referral to pt and referral to ortho. -     cyclobenzaprine (FLEXERIL) 10 mg tablet; Take 1 Tablet by mouth nightly. 3. Acute pain of right shoulder       Regarding tongue lesion - States it is not painful and has not noticed it before. Had recent dental work on that side. Could be 2/2 trauma or grinding? Noted to follow up with dentist if changing or not improving. I have discussed the diagnosis with the patient and the intended plan as seen in the above orders. The patient has received an after-visit summary and questions were answered concerning future plans. I have discussed medication side effects and warnings with the patient as well.     Iggy Miranda, DO

## 2023-04-04 NOTE — PROGRESS NOTES
Chief Complaint   Patient presents with    Other     Swollen lymph node on neck, noticed 2-3 mos ago. Shoulder Pain     States she is having right shoulder pain than travels down arm sometimes      Identified pt with two pt identifiers(name and ). Chief Complaint   Patient presents with    Other     Swollen lymph node on neck, noticed 2-3 mos ago. Shoulder Pain     States she is having right shoulder pain than travels down arm sometimes        3 most recent PHQ Screens 12/15/2022   Little interest or pleasure in doing things Not at all   Feeling down, depressed, irritable, or hopeless Not at all   Total Score PHQ 2 0        There were no vitals filed for this visit. Health Maintenance Due   Topic Date Due    Varicella Vaccine (1 of 2 - 2-dose childhood series) Never done    Cervical cancer screen  Never done    COVID-19 Vaccine (3 - Booster for Moderna series) 2021        1. Have you been to the ER, urgent care clinic since your last visit? Hospitalized since your last visit? No    2. Have you seen or consulted any other health care providers outside of the [address] [place] [address] since your last visit? Include any pap smears or colon screening. No    3. For patients aged 39-70: Has the patient had a colonoscopy / FIT/ Cologuard? No      If the patient is female:    4. For patients aged 41-77: Has the patient had a mammogram within the past 2 years? NA - based on age or sex      11. For patients aged 21-65: Has the patient had a pap smear?  Yes - no Care Gap present

## 2023-04-07 ENCOUNTER — HOSPITAL ENCOUNTER (OUTPATIENT)
Dept: ULTRASOUND IMAGING | Age: 33
End: 2023-04-07
Attending: FAMILY MEDICINE
Payer: COMMERCIAL

## 2023-04-22 DIAGNOSIS — D75.839 THROMBOCYTOSIS: Primary | ICD-10-CM

## 2023-09-20 DIAGNOSIS — R92.8 ABNORMAL MAMMOGRAM: Primary | ICD-10-CM

## 2024-01-03 ENCOUNTER — OFFICE VISIT (OUTPATIENT)
Dept: PRIMARY CARE CLINIC | Facility: CLINIC | Age: 34
End: 2024-01-03
Payer: COMMERCIAL

## 2024-01-03 VITALS
OXYGEN SATURATION: 98 % | HEIGHT: 67 IN | BODY MASS INDEX: 35.25 KG/M2 | TEMPERATURE: 98 F | SYSTOLIC BLOOD PRESSURE: 130 MMHG | RESPIRATION RATE: 20 BRPM | DIASTOLIC BLOOD PRESSURE: 85 MMHG | HEART RATE: 88 BPM | WEIGHT: 224.6 LBS

## 2024-01-03 DIAGNOSIS — Z23 ENCOUNTER FOR IMMUNIZATION: ICD-10-CM

## 2024-01-03 DIAGNOSIS — I10 PRIMARY HYPERTENSION: ICD-10-CM

## 2024-01-03 DIAGNOSIS — K21.9 GASTROESOPHAGEAL REFLUX DISEASE, UNSPECIFIED WHETHER ESOPHAGITIS PRESENT: ICD-10-CM

## 2024-01-03 DIAGNOSIS — Z00.00 WELL WOMAN EXAM (NO GYNECOLOGICAL EXAM): Primary | ICD-10-CM

## 2024-01-03 PROCEDURE — 90471 IMMUNIZATION ADMIN: CPT | Performed by: FAMILY MEDICINE

## 2024-01-03 PROCEDURE — 99214 OFFICE O/P EST MOD 30 MIN: CPT | Performed by: FAMILY MEDICINE

## 2024-01-03 PROCEDURE — 3079F DIAST BP 80-89 MM HG: CPT | Performed by: FAMILY MEDICINE

## 2024-01-03 PROCEDURE — 90674 CCIIV4 VAC NO PRSV 0.5 ML IM: CPT | Performed by: FAMILY MEDICINE

## 2024-01-03 PROCEDURE — 3075F SYST BP GE 130 - 139MM HG: CPT | Performed by: FAMILY MEDICINE

## 2024-01-03 PROCEDURE — 99395 PREV VISIT EST AGE 18-39: CPT | Performed by: FAMILY MEDICINE

## 2024-01-03 RX ORDER — SUCRALFATE ORAL 1 G/10ML
1 SUSPENSION ORAL 4 TIMES DAILY PRN
Qty: 1200 ML | Refills: 3 | Status: SHIPPED | OUTPATIENT
Start: 2024-01-03

## 2024-01-03 RX ORDER — SPIRONOLACTONE 50 MG/1
50 TABLET, FILM COATED ORAL DAILY
Qty: 90 TABLET | Refills: 3 | Status: SHIPPED | OUTPATIENT
Start: 2024-01-03

## 2024-01-03 RX ORDER — PANTOPRAZOLE SODIUM 40 MG/1
40 TABLET, DELAYED RELEASE ORAL DAILY
Qty: 90 TABLET | Refills: 3 | Status: SHIPPED | OUTPATIENT
Start: 2024-01-03

## 2024-01-03 SDOH — ECONOMIC STABILITY: FOOD INSECURITY: WITHIN THE PAST 12 MONTHS, YOU WORRIED THAT YOUR FOOD WOULD RUN OUT BEFORE YOU GOT MONEY TO BUY MORE.: PATIENT DECLINED

## 2024-01-03 SDOH — ECONOMIC STABILITY: INCOME INSECURITY: HOW HARD IS IT FOR YOU TO PAY FOR THE VERY BASICS LIKE FOOD, HOUSING, MEDICAL CARE, AND HEATING?: PATIENT DECLINED

## 2024-01-03 SDOH — ECONOMIC STABILITY: HOUSING INSECURITY
IN THE LAST 12 MONTHS, WAS THERE A TIME WHEN YOU DID NOT HAVE A STEADY PLACE TO SLEEP OR SLEPT IN A SHELTER (INCLUDING NOW)?: PATIENT DECLINED

## 2024-01-03 SDOH — ECONOMIC STABILITY: FOOD INSECURITY: WITHIN THE PAST 12 MONTHS, THE FOOD YOU BOUGHT JUST DIDN'T LAST AND YOU DIDN'T HAVE MONEY TO GET MORE.: PATIENT DECLINED

## 2024-01-03 ASSESSMENT — PATIENT HEALTH QUESTIONNAIRE - PHQ9
1. LITTLE INTEREST OR PLEASURE IN DOING THINGS: 0
SUM OF ALL RESPONSES TO PHQ QUESTIONS 1-9: 0
SUM OF ALL RESPONSES TO PHQ9 QUESTIONS 1 & 2: 0
SUM OF ALL RESPONSES TO PHQ QUESTIONS 1-9: 0
2. FEELING DOWN, DEPRESSED OR HOPELESS: 0
SUM OF ALL RESPONSES TO PHQ QUESTIONS 1-9: 0
SUM OF ALL RESPONSES TO PHQ QUESTIONS 1-9: 0

## 2024-01-03 NOTE — PROGRESS NOTES
\"Have you been to the ER, urgent care clinic since your last visit?  Hospitalized since your last visit?\"    NO    “Have you seen or consulted any other health care providers outside of [facility] since your last visit?”    NO        “Have you had a pap smear?”    YES - Where: [facility] [place] Nurse/CMA to request most recent records if not in the chart

## 2024-01-03 NOTE — PROGRESS NOTES
HPI:  Elisa Milian is a 33 y.o. female presenting for well woman exam.     HTN - On Spironolactone. BP well controlled at home. No cp, ha, bv, sob.   GERD - On Protonix and Carafate. Well controlled per patient.     Diet: normal  Exercise: walking 2-3 days weekly   Tobacco Use: former smoker, quit 9 years ago   Alcohol: socially  Desires STD testing: no    Allergies- reviewed:   Allergies   Allergen Reactions    Amoxicillin-Pot Clavulanate Hives       Medications- reviewed:   Current Outpatient Medications   Medication Sig    sucralfate (CARAFATE) 1 GM/10ML suspension Take 10 mLs by mouth 4 times daily as needed (GERD)    pantoprazole (PROTONIX) 40 MG tablet Take 1 tablet by mouth daily    spironolactone (ALDACTONE) 50 MG tablet Take 1 tablet by mouth daily    Multiple Vitamin (MULTIVITAMIN PO) Take by mouth    vitamin D (CHOLECALCIFEROL) 25 MCG (1000 UT) TABS tablet Take by mouth daily    levonorgestrel (MIRENA) IUD 52 mg 1 Device by IntraUTERine route once     No current facility-administered medications for this visit.         Past Medical History- reviewed:  Past Medical History:   Diagnosis Date    HPV in female     Hypertension          Past Surgical History- reviewed:   Past Surgical History:   Procedure Laterality Date    OTHER SURGICAL HISTORY  2001    multiple calcium deposit cyst removals         Family History - reviewed:  Family History   Problem Relation Age of Onset    Hypertension Brother     High Blood Pressure Brother     Cancer Mother     Diabetes Father     Stroke Father     High Blood Pressure Maternal Grandmother     High Cholesterol Maternal Grandmother     Lupus Maternal Grandmother     High Blood Pressure Brother     Lupus Maternal Aunt          Social History - reviewed:  Social History     Socioeconomic History    Marital status: Single     Spouse name: Not on file    Number of children: Not on file    Years of education: Not on file    Highest education level: Not on file   Occupational

## 2024-01-05 LAB
ALBUMIN SERPL-MCNC: 4.6 G/DL (ref 3.9–4.9)
ALBUMIN/GLOB SERPL: 1.7 {RATIO} (ref 1.2–2.2)
ALP SERPL-CCNC: 76 IU/L (ref 44–121)
ALT SERPL-CCNC: 39 IU/L (ref 0–32)
AST SERPL-CCNC: 30 IU/L (ref 0–40)
BILIRUB SERPL-MCNC: <0.2 MG/DL (ref 0–1.2)
BUN SERPL-MCNC: 7 MG/DL (ref 6–20)
BUN/CREAT SERPL: 10 (ref 9–23)
CALCIUM SERPL-MCNC: 9.4 MG/DL (ref 8.7–10.2)
CHLORIDE SERPL-SCNC: 101 MMOL/L (ref 96–106)
CHOLEST SERPL-MCNC: 217 MG/DL (ref 100–199)
CO2 SERPL-SCNC: 22 MMOL/L (ref 20–29)
CREAT SERPL-MCNC: 0.67 MG/DL (ref 0.57–1)
EGFRCR SERPLBLD CKD-EPI 2021: 118 ML/MIN/1.73
ERYTHROCYTE [DISTWIDTH] IN BLOOD BY AUTOMATED COUNT: 11.5 % (ref 11.7–15.4)
GLOBULIN SER CALC-MCNC: 2.7 G/DL (ref 1.5–4.5)
GLUCOSE SERPL-MCNC: 88 MG/DL (ref 70–99)
HCT VFR BLD AUTO: 42.1 % (ref 34–46.6)
HDLC SERPL-MCNC: 38 MG/DL
HGB BLD-MCNC: 13.9 G/DL (ref 11.1–15.9)
LDLC SERPL CALC-MCNC: 153 MG/DL (ref 0–99)
MCH RBC QN AUTO: 31.4 PG (ref 26.6–33)
MCHC RBC AUTO-ENTMCNC: 33 G/DL (ref 31.5–35.7)
MCV RBC AUTO: 95 FL (ref 79–97)
PLATELET # BLD AUTO: 503 X10E3/UL (ref 150–450)
POTASSIUM SERPL-SCNC: 4.1 MMOL/L (ref 3.5–5.2)
PROT SERPL-MCNC: 7.3 G/DL (ref 6–8.5)
RBC # BLD AUTO: 4.42 X10E6/UL (ref 3.77–5.28)
SODIUM SERPL-SCNC: 139 MMOL/L (ref 134–144)
TRIGL SERPL-MCNC: 143 MG/DL (ref 0–149)
VLDLC SERPL CALC-MCNC: 26 MG/DL (ref 5–40)
WBC # BLD AUTO: 6.1 X10E3/UL (ref 3.4–10.8)

## 2024-10-16 ENCOUNTER — APPOINTMENT (OUTPATIENT)
Facility: HOSPITAL | Age: 34
End: 2024-10-16
Payer: COMMERCIAL

## 2024-10-16 ENCOUNTER — HOSPITAL ENCOUNTER (EMERGENCY)
Facility: HOSPITAL | Age: 34
Discharge: HOME | End: 2024-10-16
Attending: STUDENT IN AN ORGANIZED HEALTH CARE EDUCATION/TRAINING PROGRAM
Payer: COMMERCIAL

## 2024-10-16 VITALS
OXYGEN SATURATION: 98 % | BODY MASS INDEX: 32.89 KG/M2 | RESPIRATION RATE: 18 BRPM | SYSTOLIC BLOOD PRESSURE: 153 MMHG | DIASTOLIC BLOOD PRESSURE: 97 MMHG | HEART RATE: 76 BPM | HEIGHT: 68 IN | TEMPERATURE: 99 F | WEIGHT: 217 LBS

## 2024-10-16 DIAGNOSIS — R07.9 CHEST PAIN, UNSPECIFIED TYPE: Primary | ICD-10-CM

## 2024-10-16 LAB
ANION GAP SERPL CALC-SCNC: 11 MMOL/L (ref 2–12)
BASOPHILS # BLD: 0 K/UL (ref 0–0.1)
BASOPHILS NFR BLD: 0 % (ref 0–1)
BUN SERPL-MCNC: 10 MG/DL (ref 6–20)
BUN/CREAT SERPL: 11 (ref 12–20)
CALCIUM SERPL-MCNC: 10.2 MG/DL (ref 8.5–10.1)
CHLORIDE SERPL-SCNC: 99 MMOL/L (ref 97–108)
CO2 SERPL-SCNC: 28 MMOL/L (ref 21–32)
CREAT SERPL-MCNC: 0.9 MG/DL (ref 0.55–1.02)
DIFFERENTIAL METHOD BLD: ABNORMAL
EOSINOPHIL # BLD: 0.2 K/UL (ref 0–0.4)
EOSINOPHIL NFR BLD: 2 % (ref 0–7)
ERYTHROCYTE [DISTWIDTH] IN BLOOD BY AUTOMATED COUNT: 11 % (ref 11.5–14.5)
GLUCOSE SERPL-MCNC: 114 MG/DL (ref 65–100)
HCT VFR BLD AUTO: 41.1 % (ref 35–47)
HGB BLD-MCNC: 14 G/DL (ref 11.5–16)
IMM GRANULOCYTES # BLD AUTO: 0 K/UL
IMM GRANULOCYTES NFR BLD AUTO: 0 %
LYMPHOCYTES # BLD: 6 K/UL (ref 0.8–3.5)
LYMPHOCYTES NFR BLD: 55 % (ref 12–49)
MCH RBC QN AUTO: 31 PG (ref 26–34)
MCHC RBC AUTO-ENTMCNC: 34.1 G/DL (ref 30–36.5)
MCV RBC AUTO: 90.9 FL (ref 80–99)
MONOCYTES # BLD: 0.6 K/UL (ref 0–1)
MONOCYTES NFR BLD: 6 % (ref 5–13)
NEUTS SEG # BLD: 4 K/UL (ref 1.8–8)
NEUTS SEG NFR BLD: 37 % (ref 32–75)
NRBC # BLD: 0 K/UL (ref 0–0.01)
NRBC BLD-RTO: 0 PER 100 WBC
PLATELET # BLD AUTO: 491 K/UL (ref 150–400)
PMV BLD AUTO: 8.3 FL (ref 8.9–12.9)
POTASSIUM SERPL-SCNC: 3.3 MMOL/L (ref 3.5–5.1)
RBC # BLD AUTO: 4.52 M/UL (ref 3.8–5.2)
RBC MORPH BLD: ABNORMAL
SODIUM SERPL-SCNC: 138 MMOL/L (ref 136–145)
TROPONIN I SERPL HS-MCNC: 6 NG/L (ref 0–51)
WBC # BLD AUTO: 10.8 K/UL (ref 3.6–11)

## 2024-10-16 PROCEDURE — 84484 ASSAY OF TROPONIN QUANT: CPT

## 2024-10-16 PROCEDURE — 99285 EMERGENCY DEPT VISIT HI MDM: CPT

## 2024-10-16 PROCEDURE — 93005 ELECTROCARDIOGRAM TRACING: CPT | Performed by: STUDENT IN AN ORGANIZED HEALTH CARE EDUCATION/TRAINING PROGRAM

## 2024-10-16 PROCEDURE — 71046 X-RAY EXAM CHEST 2 VIEWS: CPT

## 2024-10-16 PROCEDURE — 85025 COMPLETE CBC W/AUTO DIFF WBC: CPT

## 2024-10-16 PROCEDURE — 36415 COLL VENOUS BLD VENIPUNCTURE: CPT

## 2024-10-16 PROCEDURE — 80048 BASIC METABOLIC PNL TOTAL CA: CPT

## 2024-10-16 ASSESSMENT — ENCOUNTER SYMPTOMS: SHORTNESS OF BREATH: 0

## 2024-10-17 LAB
EKG ATRIAL RATE: 100 BPM
EKG DIAGNOSIS: NORMAL
EKG P AXIS: 47 DEGREES
EKG P-R INTERVAL: 146 MS
EKG Q-T INTERVAL: 334 MS
EKG QRS DURATION: 82 MS
EKG QTC CALCULATION (BAZETT): 430 MS
EKG R AXIS: 27 DEGREES
EKG T AXIS: 32 DEGREES
EKG VENTRICULAR RATE: 100 BPM

## 2024-10-17 NOTE — ED PROVIDER NOTES
includes ACS, musculoskeletal pain, less likely pneumonia.  X-ray negative, labs reassuring, EKG nonischemic.  Heart score of 2, low risk, will plan on discharge.  Patient is PERC rule negative, do not suspect PE, no indication for further workup.  Return precautions given.    Problems Addressed:  Chest pain, unspecified type: acute illness or injury    Amount and/or Complexity of Data Reviewed  Labs: ordered.  Radiology: ordered and independent interpretation performed.     Details: No consolidation on my read of chest x-ray  ECG/medicine tests: ordered and independent interpretation performed. Decision-making details documented in ED Course.            REASSESSMENT     ED Course as of 10/16/24 2227   Wed Oct 16, 2024   2127 ED EKG interpretation:9:27 PM  Rhythm: normal sinus rhythm;  Rate (approx.): 100; Axis: normal; QRS interval: normal ; ST/T wave: normal; Other findings: normal.    [JW]      ED Course User Index  [JW] Claus Diop MD         CONSULTS:  None    PROCEDURES:  Unless otherwise noted below, none     Procedures    DISCHARGE NOTE:  10:29 PM  The patient has been re-evaluated and feeling much better and are stable for discharge.  All available radiology and laboratory results have been reviewed with patient and/or available family.  Patient and/or family verbally conveyed their understanding and agreement of the patient's signs, symptoms, diagnosis, treatment and prognosis and additionally agree to follow-up as recommended in the discharge instructions or to return to the Emergency Department should their condition change or worsen prior to their follow-up appointment.  All questions have been answered and patient and/or available family express understanding.      LABORATORY RESULTS:  Labs Reviewed   BASIC METABOLIC PANEL - Abnormal; Notable for the following components:       Result Value    Potassium 3.3 (*)     Glucose 114 (*)     BUN/Creatinine Ratio 11 (*)     Calcium 10.2 (*)     All other

## 2024-10-17 NOTE — ED TRIAGE NOTES
Patient ambulatory into ED c/o intermittent chest pain that radiates into L arm x 1 week. Patient denies any exacerbating factors. Denies any cardiac hx.

## 2024-10-22 ENCOUNTER — OFFICE VISIT (OUTPATIENT)
Dept: PRIMARY CARE CLINIC | Facility: CLINIC | Age: 34
End: 2024-10-22
Payer: COMMERCIAL

## 2024-10-22 VITALS
OXYGEN SATURATION: 99 % | DIASTOLIC BLOOD PRESSURE: 81 MMHG | RESPIRATION RATE: 20 BRPM | WEIGHT: 219.6 LBS | HEIGHT: 68 IN | HEART RATE: 90 BPM | BODY MASS INDEX: 33.28 KG/M2 | TEMPERATURE: 97.8 F | SYSTOLIC BLOOD PRESSURE: 145 MMHG

## 2024-10-22 DIAGNOSIS — E66.9 OBESITY (BMI 30-39.9): ICD-10-CM

## 2024-10-22 DIAGNOSIS — E87.6 HYPOKALEMIA: ICD-10-CM

## 2024-10-22 DIAGNOSIS — E66.9 OBESITY (BMI 30-39.9): Primary | ICD-10-CM

## 2024-10-22 LAB
EST. AVERAGE GLUCOSE BLD GHB EST-MCNC: 105 MG/DL
HBA1C MFR BLD: 5.3 % (ref 4–5.6)
POTASSIUM SERPL-SCNC: 4.3 MMOL/L (ref 3.5–5.1)
TSH SERPL DL<=0.05 MIU/L-ACNC: 1.95 UIU/ML (ref 0.36–3.74)

## 2024-10-22 PROCEDURE — 99214 OFFICE O/P EST MOD 30 MIN: CPT | Performed by: FAMILY MEDICINE

## 2024-10-22 NOTE — PROGRESS NOTES
Health Decision Maker has been checked with the patient      AI form WAS signed    Chief Complaint   Patient presents with    Weight Management     Patient would like to discuss weight loss options       \"Have you been to the ER, urgent care clinic since your last visit?  Hospitalized since your last visit?\"    YES - When: approximately 1  weeks ago.  Where and Why: [facility] .    “Have you seen or consulted any other health care providers outside of [facility] since your last visit?”    NO      Vitals:    10/22/24 0935   Temp: 97.8 °F (36.6 °C)   TempSrc: Oral   Weight: 99.6 kg (219 lb 9.6 oz)      Depression: Not at risk (1/3/2024)    PHQ-2     PHQ-2 Score: 0              Click Here for Release of Records Request    Chart reviewed: immunizations are documented.   Immunization History   Administered Date(s) Administered    COVID-19, MODERNA BLUE border, Primary or Immunocompromised, (age 12y+), IM, 100 mcg/0.5mL 03/10/2021, 04/07/2021    Influenza, FLUARIX, FLULAVAL, FLUZONE (age 6 mo+) and AFLURIA, (age 3 y+), Quadv PF, 0.5mL 11/22/2022    Influenza, FLUCELVAX, (age 6 mo+), MDCK, Quadv PF, 0.5mL 01/03/2024    TDaP, ADACEL (age 10y-64y), BOOSTRIX (age 10y+), IM, 0.5mL 04/27/2018    Td vaccine (adult) 09/21/2020

## 2024-10-22 NOTE — PROGRESS NOTES
HPI     Chief Complaint   Patient presents with    Weight Management     Patient would like to discuss weight loss options       History of Present Illness  The patient is a 33-year-old female who is coming in for weight management.    She has been making efforts to lose weight through regular exercise and a balanced diet, but her weight continues to fluctuate. Despite reducing her carbohydrate intake and increasing her consumption of fruits and vegetables, she has not been able to maintain a consistent weight loss. She has attempted calorie counting in the past but found it difficult to sustain.    Her current exercise routine includes morning gym sessions with her sister and occasional walks, totaling 4 to 5 days of physical activity per week. She has previously tried weight loss programs such as Weight Watchers and Noom but has not consulted a nutritionist or participated in a medically supervised weight loss program. She has not used any weight loss medications.    She reports normal blood pressure readings at home and does not have any history of seizures or heavy alcohol use. She does not take opioids.     Reviewed PmHx, RxHx, FmHx, SocHx, AllgHx and updated and dated in the chart.    Physical Exam:  BP (!) 145/81   Pulse 90   Temp 97.8 °F (36.6 °C) (Oral)   Resp 20   Ht 1.727 m (5' 8\")   Wt 99.6 kg (219 lb 9.6 oz)   SpO2 99%   BMI 33.39 kg/m²     Physical Exam  WNWD NAD  RRR no murmur  CTA no wheezes ronchi rales  No focal deficits       Results  Laboratory Studies  Potassium level was slightly low on recent labs.        No results found for this or any previous visit (from the past 12 hour(s)).}        Assessment / Plan       ICD-10-CM    1. Obesity (BMI 30-39.9)  E66.9 Hemoglobin A1C     TSH     [facility]il[facility] Le[facility]     naltrexone-buPROPion (CONTRAVE) 8-90 MG per extended release tablet      2. Hypokalemia  E87.6 Potassium           Assessment

## 2024-12-02 ENCOUNTER — TELEPHONE (OUTPATIENT)
Dept: PRIMARY CARE CLINIC | Facility: CLINIC | Age: 34
End: 2024-12-02

## 2024-12-02 NOTE — TELEPHONE ENCOUNTER
Called patient, no answer left vm to call office back. Was calling patient to let her know there is a scheduling conflict for her appt tomorrow 12/3 and we will need to reschedule her. Is she able to come in earlier that same day at either 10 or 10:15 per Dr. Pompa.

## 2024-12-03 ENCOUNTER — OFFICE VISIT (OUTPATIENT)
Dept: PRIMARY CARE CLINIC | Facility: CLINIC | Age: 34
End: 2024-12-03
Payer: COMMERCIAL

## 2024-12-03 VITALS
BODY MASS INDEX: 32.67 KG/M2 | OXYGEN SATURATION: 99 % | WEIGHT: 215.6 LBS | TEMPERATURE: 98 F | SYSTOLIC BLOOD PRESSURE: 138 MMHG | HEART RATE: 90 BPM | DIASTOLIC BLOOD PRESSURE: 81 MMHG | RESPIRATION RATE: 20 BRPM | HEIGHT: 68 IN

## 2024-12-03 DIAGNOSIS — E66.9 OBESITY (BMI 30-39.9): Primary | ICD-10-CM

## 2024-12-03 DIAGNOSIS — Z71.3 WEIGHT LOSS COUNSELING, ENCOUNTER FOR: ICD-10-CM

## 2024-12-03 PROCEDURE — 99213 OFFICE O/P EST LOW 20 MIN: CPT | Performed by: FAMILY MEDICINE

## 2024-12-03 NOTE — PROGRESS NOTES
HPI     Chief Complaint   Patient presents with    Weight Management       History of Present Illness  The patient is a 34-year-old female who is coming in for a weight check.    She initiated Contrave therapy approximately one month ago, resulting in a weight loss of about 4 pounds. She reports experiencing mild nausea, particularly in the morning, but no severe symptoms such as vomiting. Her appetite remains intact, with no difficulty in consuming food or fluids. She has observed a decrease in her food intake, even during festive occasions like Thanksgiving. She also notes a reduction in her craving for sweets. She does not report any mood disturbances associated with the medication, although she experienced transient irritability during dose escalation, which resolved within 2 to 3 days. No thoughts of self harm. She is currently on a regimen of 2 tablets in the morning and 1 at night. She expresses satisfaction with the treatment outcomes.    MEDICATIONS  Current: Contrave     Starting weight - 219 lbs   Current weight - 215 lbs (1.8%)      Reviewed PmHx, RxHx, FmHx, SocHx, AllgHx and updated and dated in the chart.    Physical Exam:  /81   Pulse 90   Temp 98 °F (36.7 °C) (Oral)   Resp 20   Ht 1.727 m (5' 8\")   Wt 97.8 kg (215 lb 9.6 oz)   SpO2 99%   BMI 32.78 kg/m²     Physical Exam  WNWD NAD  RRR no murmur  CTA no wheezes ronchi rales  No focal deficits       Results          No results found for this or any previous visit (from the past 12 hour(s)).}        Assessment / Plan     No diagnosis found.     Assessment & Plan  1. Weight management.  She has experienced a weight loss of approximately 4 pounds, which equates to a 1.8 percent reduction in her total body weight. She is currently on a regimen of Contrave, taking 2 tablets in the morning and 1 at night. She has not yet fully titrated up to the maximum dose.  She is advised to continue with her current dosage of Contrave for a minimum of

## 2024-12-03 NOTE — PROGRESS NOTES
Health Decision Maker has been checked with the patient      AI form was signed    Chief Complaint   Patient presents with    Weight Management       \"Have you been to the ER, urgent care clinic since your last visit?  Hospitalized since your last visit?\"    NO    “Have you seen or consulted any other health care providers outside of [facility] since your last visit?”    NO      Vitals:    12/03/24 0959   Weight: 97.8 kg (215 lb 9.6 oz)      Depression: Not at risk (1/3/2024)    PHQ-2     PHQ-2 Score: 0              Click Here for Release of Records Request    Chart reviewed: immunizations are documented.   Immunization History   Administered Date(s) Administered    COVID-19, MODERNA BLUE border, Primary or Immunocompromised, (age 12y+), IM, 100 mcg/0.5mL 03/10/2021, 04/07/2021    Influenza, FLUARIX, FLULAVAL, FLUZONE (age 6 mo+) and AFLURIA, (age 3 y+), Quadv PF, 0.5mL 11/22/2022    Influenza, FLUCELVAX, (age 6 mo+), MDCK, Quadv PF, 0.5mL 01/03/2024    TDaP, ADACEL (age 10y-64y), BOOSTRIX (age 10y+), IM, 0.5mL 04/27/2018    Td vaccine (adult) 09/21/2020

## 2025-01-31 DIAGNOSIS — I10 PRIMARY HYPERTENSION: ICD-10-CM

## 2025-01-31 RX ORDER — SPIRONOLACTONE 50 MG/1
50 TABLET, FILM COATED ORAL DAILY
Qty: 90 TABLET | Refills: 0 | Status: SHIPPED | OUTPATIENT
Start: 2025-01-31

## 2025-06-06 DIAGNOSIS — E66.9 OBESITY (BMI 30-39.9): ICD-10-CM

## 2025-06-08 RX ORDER — NALTREXONE HYDROCHLORIDE AND BUPROPION HYDROCHLORIDE 8; 90 MG/1; MG/1
TABLET, EXTENDED RELEASE ORAL
Qty: 120 TABLET | Refills: 0 | OUTPATIENT
Start: 2025-06-08

## 2025-06-30 DIAGNOSIS — I10 PRIMARY HYPERTENSION: ICD-10-CM

## 2025-06-30 RX ORDER — SPIRONOLACTONE 50 MG/1
TABLET, FILM COATED ORAL
Qty: 90 TABLET | Refills: 0 | OUTPATIENT
Start: 2025-06-30

## 2025-07-02 DIAGNOSIS — I10 PRIMARY HYPERTENSION: ICD-10-CM

## 2025-07-03 RX ORDER — SPIRONOLACTONE 50 MG/1
50 TABLET, FILM COATED ORAL DAILY
Qty: 90 TABLET | Refills: 0 | OUTPATIENT
Start: 2025-07-03

## 2025-07-21 ENCOUNTER — OFFICE VISIT (OUTPATIENT)
Dept: PRIMARY CARE CLINIC | Facility: CLINIC | Age: 35
End: 2025-07-21
Payer: COMMERCIAL

## 2025-07-21 VITALS
OXYGEN SATURATION: 99 % | WEIGHT: 227 LBS | TEMPERATURE: 97.9 F | DIASTOLIC BLOOD PRESSURE: 84 MMHG | BODY MASS INDEX: 34.4 KG/M2 | HEART RATE: 89 BPM | RESPIRATION RATE: 18 BRPM | HEIGHT: 68 IN | SYSTOLIC BLOOD PRESSURE: 137 MMHG

## 2025-07-21 DIAGNOSIS — K21.9 GASTROESOPHAGEAL REFLUX DISEASE, UNSPECIFIED WHETHER ESOPHAGITIS PRESENT: ICD-10-CM

## 2025-07-21 DIAGNOSIS — Z00.00 WELL WOMAN EXAM (NO GYNECOLOGICAL EXAM): ICD-10-CM

## 2025-07-21 DIAGNOSIS — I10 PRIMARY HYPERTENSION: Primary | ICD-10-CM

## 2025-07-21 DIAGNOSIS — Z01.84 IMMUNITY STATUS TESTING: ICD-10-CM

## 2025-07-21 PROCEDURE — 99214 OFFICE O/P EST MOD 30 MIN: CPT | Performed by: FAMILY MEDICINE

## 2025-07-21 PROCEDURE — 3075F SYST BP GE 130 - 139MM HG: CPT | Performed by: FAMILY MEDICINE

## 2025-07-21 PROCEDURE — 99395 PREV VISIT EST AGE 18-39: CPT | Performed by: FAMILY MEDICINE

## 2025-07-21 PROCEDURE — 3079F DIAST BP 80-89 MM HG: CPT | Performed by: FAMILY MEDICINE

## 2025-07-21 RX ORDER — SUCRALFATE ORAL 1 G/10ML
1 SUSPENSION ORAL 4 TIMES DAILY PRN
Qty: 1200 ML | Refills: 3 | Status: SHIPPED | OUTPATIENT
Start: 2025-07-21

## 2025-07-21 RX ORDER — SPIRONOLACTONE 50 MG/1
50 TABLET, FILM COATED ORAL DAILY
Qty: 90 TABLET | Refills: 0 | Status: SHIPPED | OUTPATIENT
Start: 2025-07-21

## 2025-07-21 RX ORDER — PANTOPRAZOLE SODIUM 40 MG/1
40 TABLET, DELAYED RELEASE ORAL DAILY
Qty: 90 TABLET | Refills: 3 | Status: SHIPPED | OUTPATIENT
Start: 2025-07-21

## 2025-07-21 SDOH — ECONOMIC STABILITY: FOOD INSECURITY: WITHIN THE PAST 12 MONTHS, THE FOOD YOU BOUGHT JUST DIDN'T LAST AND YOU DIDN'T HAVE MONEY TO GET MORE.: NEVER TRUE

## 2025-07-21 SDOH — ECONOMIC STABILITY: FOOD INSECURITY: WITHIN THE PAST 12 MONTHS, YOU WORRIED THAT YOUR FOOD WOULD RUN OUT BEFORE YOU GOT MONEY TO BUY MORE.: NEVER TRUE

## 2025-07-21 ASSESSMENT — PATIENT HEALTH QUESTIONNAIRE - PHQ9
2. FEELING DOWN, DEPRESSED OR HOPELESS: NOT AT ALL
SUM OF ALL RESPONSES TO PHQ QUESTIONS 1-9: 0
1. LITTLE INTEREST OR PLEASURE IN DOING THINGS: NOT AT ALL
SUM OF ALL RESPONSES TO PHQ QUESTIONS 1-9: 0

## 2025-07-21 NOTE — PROGRESS NOTES
Health Decision Maker has been checked with the patient      AI form was signed    Chief Complaint   Patient presents with    Annual Exam       \"Have you been to the ER, urgent care clinic since your last visit?  Hospitalized since your last visit?\"    NO    “Have you seen or consulted any other health care providers outside of [facility] since your last visit?”    NO      Vitals:    07/21/25 1226   Temp: 97.9 °F (36.6 °C)   TempSrc: Oral   Weight: 103 kg (227 lb)   Height: 1.727 m (5' 8\")      Depression: Not at risk (7/21/2025)    PHQ-2     PHQ-2 Score: 0              Click Here for Release of Records Request    Chart reviewed: immunizations are documented.   Immunization History   Administered Date(s) Administered    COVID-19, MODERNA BLUE border, Primary or Immunocompromised, (age 12y+), IM, 100 mcg/0.5mL 03/10/2021, 04/07/2021    Influenza, FLUARIX, FLULAVAL, FLUZONE (age 6 mo+) and AFLURIA, (age 3 y+), Quadv PF, 0.5mL 11/22/2022    Influenza, FLUCELVAX, (age 6 mo+), MDCK, Quadv PF, 0.5mL 01/03/2024    TDaP, ADACEL (age 10y-64y), BOOSTRIX (age 10y+), IM, 0.5mL 04/27/2018    Td vaccine (adult) 09/21/2020

## 2025-07-21 NOTE — PROGRESS NOTES
HPI     Chief Complaint   Patient presents with    Annual Exam       History of Present Illness  34-year-old female here for annual exam.    Balanced diet with meat and fish, walks 2-3 days/week, former smoker (quit 10 years ago), social alcohol use, declined STD testing.    Under Dr. Shukla care for gynecological health, current Pap smear and mammograms. Mother had stage IV breast cancer at 60, both tested negative for genetic markers. No family history of colon cancer.    Takes Protonix and Carafate for acid reflux, no blood in stool or abdominal pain.    , works at [employer], lives with , daughter, and 's three children.    FAMILY HISTORY  Mother had sbreast cancer at 60, negative genetic testing. No family history of colon cancer.       Reviewed PmHx, RxHx, FmHx, SocHx, AllgHx and updated and dated in the chart.    Physical Exam:  /84   Pulse 89   Temp 97.9 °F (36.6 °C) (Oral)   Resp 18   Ht 1.727 m (5' 8\")   Wt 103 kg (227 lb)   SpO2 99%   BMI 34.52 kg/m²     Physical Exam  WNWD, NAD  RRR, no murmur  CTA, no wheezes/ ronchi/ rales  Abd soft, nttp  Tms nml, pharynx nml, nose nml  No cervical LAD  No edema  No focal deficits  Mood/ affect nml      Results        No results found for this or any previous visit (from the past 12 hours).}        Assessment / Plan       ICD-10-CM    1. Primary hypertension  I10 CBC     Comprehensive Metabolic Panel     spironolactone (ALDACTONE) 50 MG tablet      2. Gastroesophageal reflux disease, unspecified whether esophagitis present  K21.9 pantoprazole (PROTONIX) 40 MG tablet     sucralfate (CARAFATE) 1 GM/10ML suspension      3. Well woman exam (no gynecological exam)  Z00.00 Lipid Panel      4. Immunity status testing  Z01.84 Hepatitis B Surface Antibody     Varicella Zoster Antibody, IgG           Assessment & Plan  1. Annual exam:  - BP normal  - Ordered routine blood work: CBC, metabolic panel, cholesterol  - Return for fasting